# Patient Record
Sex: FEMALE | Race: WHITE | Employment: FULL TIME | URBAN - METROPOLITAN AREA
[De-identification: names, ages, dates, MRNs, and addresses within clinical notes are randomized per-mention and may not be internally consistent; named-entity substitution may affect disease eponyms.]

---

## 2017-07-21 ENCOUNTER — TELEPHONE (OUTPATIENT)
Dept: NEPHROLOGY | Facility: CLINIC | Age: 57
End: 2017-07-21

## 2017-07-21 NOTE — TELEPHONE ENCOUNTER
Spoke to Pt. Pt notified to call her insurance to see who what DME company is in their network. Pt stts she will call [facility].

## 2017-07-21 NOTE — TELEPHONE ENCOUNTER
Pt indicates [facility] was being used for C-PAP products. Pt indicates [employer] no longer excepts that. Pt is requesting for any suggestions as to what companycan be used now? Pls call back at:[phone], thanks.

## 2017-11-14 ENCOUNTER — LAB ENCOUNTER (OUTPATIENT)
Dept: LAB | Age: 57
End: 2017-11-14
Attending: INTERNAL MEDICINE
Payer: COMMERCIAL

## 2017-11-14 DIAGNOSIS — E78.5 HYPERLIPIDEMIA: Primary | ICD-10-CM

## 2017-11-14 PROCEDURE — 36415 COLL VENOUS BLD VENIPUNCTURE: CPT

## 2017-11-14 PROCEDURE — 80076 HEPATIC FUNCTION PANEL: CPT

## 2017-11-14 PROCEDURE — 80061 LIPID PANEL: CPT

## 2017-12-22 ENCOUNTER — OFFICE VISIT (OUTPATIENT)
Dept: PULMONOLOGY | Facility: CLINIC | Age: 57
End: 2017-12-22

## 2017-12-22 VITALS
RESPIRATION RATE: 18 BRPM | DIASTOLIC BLOOD PRESSURE: 75 MMHG | WEIGHT: 169.5 LBS | HEART RATE: 60 BPM | BODY MASS INDEX: 26.6 KG/M2 | HEIGHT: 67 IN | SYSTOLIC BLOOD PRESSURE: 119 MMHG | OXYGEN SATURATION: 99 %

## 2017-12-22 DIAGNOSIS — Z99.89 OSA ON CPAP: Primary | ICD-10-CM

## 2017-12-22 DIAGNOSIS — G47.33 OSA ON CPAP: Primary | ICD-10-CM

## 2017-12-22 DIAGNOSIS — J01.90 ACUTE SINUSITIS, RECURRENCE NOT SPECIFIED, UNSPECIFIED LOCATION: ICD-10-CM

## 2017-12-22 PROCEDURE — 99212 OFFICE O/P EST SF 10 MIN: CPT | Performed by: INTERNAL MEDICINE

## 2017-12-22 PROCEDURE — 99213 OFFICE O/P EST LOW 20 MIN: CPT | Performed by: INTERNAL MEDICINE

## 2017-12-22 RX ORDER — AZITHROMYCIN 250 MG/1
TABLET, FILM COATED ORAL
Qty: 6 TABLET | Refills: 0 | Status: SHIPPED | OUTPATIENT
Start: 2017-12-22 | End: 2018-10-20

## 2017-12-22 NOTE — PROGRESS NOTES
HPI:    Patient ID: Vikas Kamara is a 56year old female.     HPI  Doing very well with cpap   Much more awake and alert   No more daytime sleepiness or fatigue   No technical issue     No sob   Acute sinusitis last 2 days / mild naal -sinus press alcohol   Avoid driving if sleepy   Diet / exercise   Maintain regular sleep-awake cycles       2- acute sinusitis   z-pack                  Meds This Visit:  Signed Prescriptions Disp Refills    azithromycin 250 MG Oral Tab 6 tablet 0      Sig: take 2 tab

## 2018-07-06 ENCOUNTER — TELEPHONE (OUTPATIENT)
Dept: PULMONOLOGY | Facility: CLINIC | Age: 58
End: 2018-07-06

## 2018-07-06 DIAGNOSIS — G47.33 OSA (OBSTRUCTIVE SLEEP APNEA): Primary | ICD-10-CM

## 2018-07-06 NOTE — TELEPHONE ENCOUNTER
states wife no longer wants to continue services with [facility] for CPAP supplies.  asking for recommendations for different medical supply companies.  states he would like to try [facility].  Please call thank you [phone]

## 2018-07-06 NOTE — TELEPHONE ENCOUNTER
Spoke to Pt. Pt notified that her husaband requested a new DME company for the both of them. Pt stts she wants to switch to [facility] for cpap supplies. Per protocol orders placed for cpap supplies. Pt notified that order will be faxed to [facility].  Pt given t

## 2018-07-10 ENCOUNTER — HOSPITAL ENCOUNTER (OUTPATIENT)
Age: 58
Discharge: HOME | End: 2018-07-10
Attending: EMERGENCY MEDICINE
Payer: COMMERCIAL

## 2018-07-10 VITALS
RESPIRATION RATE: 18 BRPM | OXYGEN SATURATION: 98 % | BODY MASS INDEX: 27 KG/M2 | SYSTOLIC BLOOD PRESSURE: 151 MMHG | WEIGHT: 172 LBS | TEMPERATURE: 98 F | HEART RATE: 52 BPM | DIASTOLIC BLOOD PRESSURE: 79 MMHG
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
## 2018-07-10 PROCEDURE — 99214 OFFICE O/P EST MOD 30 MIN: CPT

## 2018-07-10 PROCEDURE — 99213 OFFICE O/P EST LOW 20 MIN: CPT

## 2018-07-10 RX ORDER — CEPHALEXIN 500 MG/1
500 CAPSULE ORAL 3 TIMES DAILY
Qty: 15 CAPSULE | Refills: 0 | Status: SHIPPED | OUTPATIENT
Start: 2018-07-10 | End: 2018-07-15

## 2018-07-10 RX ORDER — PREDNISONE 20 MG/1
40 TABLET ORAL DAILY
Qty: 10 TABLET | Refills: 0 | Status: SHIPPED | OUTPATIENT
Start: 2018-07-10 | End: 2018-07-15

## 2018-07-10 NOTE — ED INITIAL ASSESSMENT (HPI)
Has a rash on left hand for one week that comes and goes feels her face is itchy also and used allegra. Hx of poison ivy and is afraid she has it.  Small oval area dorsal hand

## 2018-07-10 NOTE — ED NOTES
Ok to use hydrocortisone cream lightly- will hold onto prednisone and take if getting worse. Fill and use po meds follow up with pcp in office as needed.

## 2018-07-10 NOTE — ED PROVIDER NOTES
Patient Seen in: [facility] Immediate Care In [address] [place] [address]    History   Patient presents with:   Allergic Rxn Allergies (immune)    Stated Complaint: lt hand rash    HPI    The patient is a 62-year-old female with no significant past medical history pr Patient is awake, alert and oriented ×3.   The patient's motor strength is 5 out of 5 and symmetric in the upper and lower extremities bilaterally  Extremities: No focal swelling or tenderness  Skin: There are 2 lesions noted to the dorsal aspect of the lef

## 2018-07-18 ENCOUNTER — APPOINTMENT (OUTPATIENT)
Dept: GENERAL RADIOLOGY | Age: 58
End: 2018-07-18
Attending: EMERGENCY MEDICINE
Payer: COMMERCIAL

## 2018-07-18 ENCOUNTER — HOSPITAL ENCOUNTER (OUTPATIENT)
Age: 58
Discharge: HOME | End: 2018-07-18
Attending: EMERGENCY MEDICINE
Payer: COMMERCIAL

## 2018-07-18 VITALS
TEMPERATURE: 98 F | HEART RATE: 63 BPM | SYSTOLIC BLOOD PRESSURE: 116 MMHG | OXYGEN SATURATION: 98 % | DIASTOLIC BLOOD PRESSURE: 71 MMHG | RESPIRATION RATE: 16 BRPM

## 2018-07-18 DIAGNOSIS — S20.211A CONTUSION OF RIGHT CHEST WALL, INITIAL ENCOUNTER: Primary | ICD-10-CM

## 2018-07-18 PROCEDURE — 99213 OFFICE O/P EST LOW 20 MIN: CPT

## 2018-07-18 PROCEDURE — 71101 X-RAY EXAM UNILAT RIBS/CHEST: CPT | Performed by: EMERGENCY MEDICINE

## 2018-07-18 PROCEDURE — 99214 OFFICE O/P EST MOD 30 MIN: CPT

## 2018-07-18 RX ORDER — IBUPROFEN 600 MG/1
600 TABLET ORAL EVERY 6 HOURS PRN
Qty: 30 TABLET | Refills: 0 | Status: SHIPPED | OUTPATIENT
Start: 2018-07-18 | End: 2018-07-25

## 2018-07-18 RX ORDER — TRAMADOL HYDROCHLORIDE 50 MG/1
TABLET ORAL EVERY 6 HOURS PRN
Qty: 20 TABLET | Refills: 0 | Status: SHIPPED | OUTPATIENT
Start: 2018-07-18 | End: 2018-10-20

## 2018-07-18 NOTE — ED INITIAL ASSESSMENT (HPI)
Pt states she tripped last night and fell into brick wall. C/o R side pain near ribs. Denies any head trauma or LOC.

## 2018-07-18 NOTE — ED PROVIDER NOTES
Patient Seen in: [facility] Immediate Care In [address] [place] [address]    History   Patient presents with:  Fall (musculoskeletal, neurologic)    Stated Complaint: fell/rib injury    HPI    67 yo female tripped and fell last night landed on her right side.  C/o pa no guarding. Musculoskeletal: Normal range of motion. She exhibits no edema or tenderness. Neurological: She is alert and oriented to person, place, and time. No cranial nerve deficit. Skin: Skin is warm and dry.    Psychiatric: She has a normal mood

## 2018-10-20 ENCOUNTER — HOSPITAL ENCOUNTER (OUTPATIENT)
Age: 58
Discharge: HOME | End: 2018-10-20
Attending: EMERGENCY MEDICINE
Payer: COMMERCIAL

## 2018-10-20 VITALS
BODY MASS INDEX: 27 KG/M2 | WEIGHT: 173 LBS | SYSTOLIC BLOOD PRESSURE: 147 MMHG | OXYGEN SATURATION: 99 % | DIASTOLIC BLOOD PRESSURE: 83 MMHG | RESPIRATION RATE: 16 BRPM | TEMPERATURE: 99 F | HEART RATE: 64 BPM

## 2018-10-20 DIAGNOSIS — J01.10 ACUTE FRONTAL SINUSITIS, RECURRENCE NOT SPECIFIED: Primary | ICD-10-CM

## 2018-10-20 PROCEDURE — 99214 OFFICE O/P EST MOD 30 MIN: CPT

## 2018-10-20 PROCEDURE — 87430 STREP A AG IA: CPT

## 2018-10-20 PROCEDURE — 99213 OFFICE O/P EST LOW 20 MIN: CPT

## 2018-10-20 RX ORDER — AMOXICILLIN 875 MG/1
875 TABLET, COATED ORAL 2 TIMES DAILY
Qty: 10 TABLET | Refills: 0 | Status: SHIPPED | OUTPATIENT
Start: 2018-10-20 | End: 2018-10-25

## 2018-10-20 RX ORDER — PREDNISONE 20 MG/1
40 TABLET ORAL DAILY
Qty: 6 TABLET | Refills: 0 | Status: SHIPPED | OUTPATIENT
Start: 2018-10-20 | End: 2018-10-23

## 2018-10-20 NOTE — ED PROVIDER NOTES
Patient Seen in: [facility] Immediate Care In [address] [place] [address]    History   Patient presents with:  Cough/URI    Stated Complaint: sinus,sore throat    HPI      HISTORY OF PRESENT ILLNESS:Patient complains of URI symptoms for 4 days.   Complains of sinus c kg   SpO2 99%   BMI 27.10 kg/m²   PULSE OX Nl on room air    General Appearance: awake, alert, non toxic  ENT, Mouth: mucous membranes moist, tender over frontal sinuses, nasal tubrinates boggy, ]    Dental exam:normal dentition  Throat exam:no erythema, n

## 2018-10-20 NOTE — ED INITIAL ASSESSMENT (HPI)
Sore throat and sinus congestions for 2 days stts strep in \"office \" and leaving for [place] Tuesday

## 2018-12-21 ENCOUNTER — OFFICE VISIT (OUTPATIENT)
Dept: PULMONOLOGY | Facility: CLINIC | Age: 58
End: 2018-12-21
Payer: COMMERCIAL

## 2018-12-21 VITALS
HEART RATE: 56 BPM | SYSTOLIC BLOOD PRESSURE: 129 MMHG | RESPIRATION RATE: 16 BRPM | WEIGHT: 171.5 LBS | DIASTOLIC BLOOD PRESSURE: 84 MMHG | HEIGHT: 67 IN | BODY MASS INDEX: 26.92 KG/M2 | OXYGEN SATURATION: 98 %

## 2018-12-21 DIAGNOSIS — Z99.89 OSA ON CPAP: Primary | ICD-10-CM

## 2018-12-21 DIAGNOSIS — G47.33 OSA ON CPAP: Primary | ICD-10-CM

## 2018-12-21 PROCEDURE — 99213 OFFICE O/P EST LOW 20 MIN: CPT | Performed by: INTERNAL MEDICINE

## 2018-12-21 PROCEDURE — 99212 OFFICE O/P EST SF 10 MIN: CPT | Performed by: INTERNAL MEDICINE

## 2018-12-21 NOTE — PROGRESS NOTES
HPI:    Patient ID: Eleazar Bradshaw is a 66year old female.     HPI    Review of Systems         Current Outpatient Medications:  tretinoin 0.025 % External Cream Apply a pea-sized amount to the entire face every other night for 2 wks then every nig

## 2019-02-20 ENCOUNTER — HOSPITAL ENCOUNTER (OUTPATIENT)
Age: 59
Discharge: HOME | End: 2019-02-20
Attending: EMERGENCY MEDICINE
Payer: COMMERCIAL

## 2019-02-20 VITALS
OXYGEN SATURATION: 99 % | RESPIRATION RATE: 18 BRPM | HEART RATE: 73 BPM | BODY MASS INDEX: 27 KG/M2 | TEMPERATURE: 99 F | DIASTOLIC BLOOD PRESSURE: 81 MMHG | WEIGHT: 170 LBS | SYSTOLIC BLOOD PRESSURE: 123 MMHG | HEIGHT: 66.5 IN

## 2019-02-20 DIAGNOSIS — J01.00 ACUTE NON-RECURRENT MAXILLARY SINUSITIS: Primary | ICD-10-CM

## 2019-02-20 PROCEDURE — 99213 OFFICE O/P EST LOW 20 MIN: CPT

## 2019-02-20 PROCEDURE — 99214 OFFICE O/P EST MOD 30 MIN: CPT

## 2019-02-20 RX ORDER — FLUTICASONE PROPIONATE 50 MCG
2 SPRAY, SUSPENSION (ML) NASAL DAILY
Qty: 16 G | Refills: 0 | Status: SHIPPED | OUTPATIENT
Start: 2019-02-20 | End: 2019-03-22

## 2019-02-20 RX ORDER — CHOLECALCIFEROL (VITAMIN D3) 125 MCG
500 CAPSULE ORAL DAILY
COMMUNITY

## 2019-02-20 RX ORDER — AMOXICILLIN AND CLAVULANATE POTASSIUM 875; 125 MG/1; MG/1
1 TABLET, FILM COATED ORAL 2 TIMES DAILY
Qty: 20 TABLET | Refills: 0 | Status: SHIPPED | OUTPATIENT
Start: 2019-02-20 | End: 2019-03-02

## 2019-02-20 NOTE — ED PROVIDER NOTES
Patient Seen in: [facility] Immediate Care In [address] [place] [address]    History   Patient presents with:  Cough/URI    Stated Complaint: sinus inf    HPI    Patient here with cough, congestion for 4 days. No travel, no known sick contacts.   Patient denies sig Resp 18   Temp 98.9 °F (37.2 °C)   Temp src Oral   SpO2 99 %   O2 Device None (Room air)       Current:/81   Pulse 73   Temp 98.9 °F (37.2 °C) (Oral)   Resp 18   Ht 168.9 cm (5' 6.5\")   Wt 77.1 kg   SpO2 99%   BMI 27.03 kg/m²   PULSE OX   GENERAL:

## 2019-02-20 NOTE — ED INITIAL ASSESSMENT (HPI)
C/o coughing headache nasal congestion and facial pain with post nasal drip for 2 days   Chills and fever

## 2019-03-05 ENCOUNTER — LAB ENCOUNTER (OUTPATIENT)
Dept: LAB | Age: 59
End: 2019-03-05
Attending: INTERNAL MEDICINE
Payer: COMMERCIAL

## 2019-03-05 DIAGNOSIS — E78.5 HYPERLIPIDEMIA: Primary | ICD-10-CM

## 2019-03-05 LAB
ALBUMIN SERPL-MCNC: 3.7 G/DL (ref 3.4–5)
ALBUMIN/GLOB SERPL: 1.2 {RATIO} (ref 1–2)
ALP LIVER SERPL-CCNC: 79 U/L (ref 46–118)
ALT SERPL-CCNC: 41 U/L (ref 13–56)
ANION GAP SERPL CALC-SCNC: 6 MMOL/L (ref 0–18)
AST SERPL-CCNC: 18 U/L (ref 15–37)
BILIRUB SERPL-MCNC: 0.4 MG/DL (ref 0.1–2)
BUN BLD-MCNC: 9 MG/DL (ref 7–18)
BUN/CREAT SERPL: 15.5 (ref 10–20)
CALCIUM BLD-MCNC: 8.5 MG/DL (ref 8.5–10.1)
CHLORIDE SERPL-SCNC: 108 MMOL/L (ref 98–107)
CHOLEST SMN-MCNC: 193 MG/DL (ref ?–200)
CO2 SERPL-SCNC: 29 MMOL/L (ref 21–32)
CREAT BLD-MCNC: 0.58 MG/DL (ref 0.55–1.02)
GLOBULIN PLAS-MCNC: 3 G/DL (ref 2.8–4.4)
GLUCOSE BLD-MCNC: 88 MG/DL (ref 70–99)
HDLC SERPL-MCNC: 47 MG/DL (ref 40–59)
LDLC SERPL CALC-MCNC: 120 MG/DL (ref ?–100)
M PROTEIN MFR SERPL ELPH: 6.7 G/DL (ref 6.4–8.2)
NONHDLC SERPL-MCNC: 146 MG/DL (ref ?–130)
OSMOLALITY SERPL CALC.SUM OF ELEC: 294 MOSM/KG (ref 275–295)
POTASSIUM SERPL-SCNC: 3.8 MMOL/L (ref 3.5–5.1)
SODIUM SERPL-SCNC: 143 MMOL/L (ref 136–145)
TRIGL SERPL-MCNC: 129 MG/DL (ref 30–149)
VLDLC SERPL CALC-MCNC: 26 MG/DL (ref 0–30)

## 2019-03-05 PROCEDURE — 80061 LIPID PANEL: CPT

## 2019-03-05 PROCEDURE — 36415 COLL VENOUS BLD VENIPUNCTURE: CPT

## 2019-03-05 PROCEDURE — 80053 COMPREHEN METABOLIC PANEL: CPT

## 2019-12-07 ENCOUNTER — HOSPITAL ENCOUNTER (OUTPATIENT)
Age: 59
Discharge: HOME | End: 2019-12-07
Attending: EMERGENCY MEDICINE
Payer: COMMERCIAL

## 2019-12-07 VITALS
DIASTOLIC BLOOD PRESSURE: 85 MMHG | HEIGHT: 67 IN | BODY MASS INDEX: 27 KG/M2 | TEMPERATURE: 99 F | OXYGEN SATURATION: 100 % | RESPIRATION RATE: 20 BRPM | SYSTOLIC BLOOD PRESSURE: 147 MMHG | HEART RATE: 60 BPM | WEIGHT: 172 LBS

## 2019-12-07 DIAGNOSIS — J01.90 ACUTE SINUSITIS, RECURRENCE NOT SPECIFIED, UNSPECIFIED LOCATION: Primary | ICD-10-CM

## 2019-12-07 PROCEDURE — 99214 OFFICE O/P EST MOD 30 MIN: CPT

## 2019-12-07 PROCEDURE — 99213 OFFICE O/P EST LOW 20 MIN: CPT

## 2019-12-07 RX ORDER — AMOXICILLIN 500 MG/1
500 TABLET, FILM COATED ORAL 3 TIMES DAILY
Qty: 30 TABLET | Refills: 0 | Status: SHIPPED | OUTPATIENT
Start: 2019-12-07 | End: 2019-12-17

## 2019-12-07 NOTE — ED PROVIDER NOTES
Patient Seen in: [facility] Immediate Care In [address] [place] [address]    History   Patient presents with:  Cough/URI    Stated Complaint: Sinus Problem    HPI    is here with concern of sinus infection she is an ex-smoker she states she gets these every once in Resp 20   Temp 98.6 °F (37 °C)   Temp src    SpO2 100 %   O2 Device None (Room air)       Current:/85   Pulse 60   Temp 98.6 °F (37 °C)   Resp 20   Ht 170.2 cm (5' 7\")   Wt 78 kg   SpO2 100%   BMI 26.94 kg/m²         Physical Exam  Constitutional: days.  Clement Max: 30 tablet Refills: 0

## 2019-12-20 ENCOUNTER — OFFICE VISIT (OUTPATIENT)
Dept: PULMONOLOGY | Facility: CLINIC | Age: 59
End: 2019-12-20
Payer: COMMERCIAL

## 2019-12-20 VITALS
RESPIRATION RATE: 18 BRPM | HEART RATE: 59 BPM | BODY MASS INDEX: 27 KG/M2 | OXYGEN SATURATION: 100 % | SYSTOLIC BLOOD PRESSURE: 123 MMHG | WEIGHT: 172 LBS | DIASTOLIC BLOOD PRESSURE: 81 MMHG | HEIGHT: 67 IN

## 2019-12-20 DIAGNOSIS — G47.33 OSA ON CPAP: Primary | ICD-10-CM

## 2019-12-20 DIAGNOSIS — Z99.89 OSA ON CPAP: Primary | ICD-10-CM

## 2019-12-20 PROCEDURE — 99213 OFFICE O/P EST LOW 20 MIN: CPT | Performed by: INTERNAL MEDICINE

## 2019-12-20 NOTE — PROGRESS NOTES
HPI:    Patient ID: Morena Quarles is a 64year old female.     HPI   yearly visit , doing great and can not sleep without cpap   No daytime sleepiness or fatigue   No sinus pressure   No headache   No technical issue with cpap therapy   Review of S requested or ordered in this encounter       Imaging & Referrals:  None       MA#[id number]

## 2020-10-09 ENCOUNTER — HOSPITAL ENCOUNTER (OUTPATIENT)
Age: 60
Discharge: HOME | End: 2020-10-09
Attending: EMERGENCY MEDICINE
Payer: COMMERCIAL

## 2020-10-09 ENCOUNTER — APPOINTMENT (OUTPATIENT)
Dept: GENERAL RADIOLOGY | Age: 60
End: 2020-10-09
Attending: EMERGENCY MEDICINE
Payer: COMMERCIAL

## 2020-10-09 VITALS
BODY MASS INDEX: 27 KG/M2 | OXYGEN SATURATION: 99 % | TEMPERATURE: 98 F | WEIGHT: 168 LBS | SYSTOLIC BLOOD PRESSURE: 138 MMHG | HEART RATE: 62 BPM | HEIGHT: 66 IN | RESPIRATION RATE: 18 BRPM | DIASTOLIC BLOOD PRESSURE: 81 MMHG

## 2020-10-09 DIAGNOSIS — M25.559 HIP PAIN: Primary | ICD-10-CM

## 2020-10-09 PROCEDURE — 73502 X-RAY EXAM HIP UNI 2-3 VIEWS: CPT | Performed by: EMERGENCY MEDICINE

## 2020-10-09 PROCEDURE — 99213 OFFICE O/P EST LOW 20 MIN: CPT | Performed by: EMERGENCY MEDICINE

## 2020-10-09 RX ORDER — METHYLPREDNISOLONE 4 MG/1
TABLET ORAL
Qty: 1 PACKAGE | Refills: 0 | Status: SHIPPED | OUTPATIENT
Start: 2020-10-09 | End: 2022-01-24

## 2020-10-09 NOTE — ED NOTES
meds and s/e reviewed. Avoid strenuous activity till acute phase resolved. Speak to pcp about alternative treatments like p/t massage/ yoga /accupuncture. Follow up in office.

## 2020-10-09 NOTE — ED PROVIDER NOTES
[facility] In [address] [place] [address]      History   Patient presents with:  Back Pain    Stated Complaint: sciatica rt leg    Patient complains of a week of pain located in the right hip and buttock.   She describes pain as a cons Pulse 62   Resp 18   Temp 98.1 °F (36.7 °C)   Temp src Temporal   SpO2 99 %   O2 Device None (Room air)       Current:/81   Pulse 62   Temp 98.1 °F (36.7 °C) (Temporal)   Resp 18   Ht 167.6 cm (5' 6\")   Wt 76.2 kg   SpO2 99%   BMI 27.12 kg/m² Behavior normal.         ED Course         XR HIP W OR WO PELVIS 2 OR 3 VIEWS, RIGHT (CPT=73502) (Final result)  Result time 10/09/20 09:20:04  Final result by Gely Stevens MD (10/09/20 09:20:04)                Impression:    CONCLUSION:   1.  Essentially

## 2020-12-18 ENCOUNTER — VIRTUAL PHONE E/M (OUTPATIENT)
Dept: PULMONOLOGY | Facility: CLINIC | Age: 60
End: 2020-12-18

## 2020-12-18 DIAGNOSIS — G47.33 OSA ON CPAP: Primary | ICD-10-CM

## 2020-12-18 DIAGNOSIS — Z99.89 OSA ON CPAP: Primary | ICD-10-CM

## 2020-12-18 PROCEDURE — 99212 OFFICE O/P EST SF 10 MIN: CPT | Performed by: INTERNAL MEDICINE

## 2020-12-18 NOTE — PROGRESS NOTES
Virtual Telephone Check-In    Ramonita Avendano verbally consents to a Virtual/Telephone Check-In visit on 12/18/20. Patient has been referred to the [facility] website at [email or web address] to review the yearly Consent to Treat document.     Patient

## 2020-12-18 NOTE — PROGRESS NOTES
HPI:    Patient ID: Lady Valadez is a 64year old female.     HPI    Review of Systems         Current Outpatient Medications   Medication Sig Dispense Refill   • methylPREDNISolone (MEDROL) 4 MG Oral Tablet Therapy Pack Dosepack: take as directed

## 2021-02-11 DIAGNOSIS — R74.8 LIVER ENZYME ELEVATION: Primary | ICD-10-CM

## 2021-02-11 DIAGNOSIS — E78.5 HYPERLIPIDEMIA LDL GOAL <100: ICD-10-CM

## 2021-02-26 ENCOUNTER — LAB ENCOUNTER (OUTPATIENT)
Dept: LAB | Facility: REFERENCE LAB | Age: 61
End: 2021-02-26
Attending: INTERNAL MEDICINE
Payer: COMMERCIAL

## 2021-02-26 DIAGNOSIS — E78.5 HYPERLIPIDEMIA LDL GOAL <100: ICD-10-CM

## 2021-02-26 LAB
ALBUMIN SERPL-MCNC: 3.8 G/DL (ref 3.4–5)
ALBUMIN/GLOB SERPL: 1.4 {RATIO} (ref 1–2)
ALP LIVER SERPL-CCNC: 87 U/L
ALT SERPL-CCNC: 38 U/L
ANION GAP SERPL CALC-SCNC: 2 MMOL/L (ref 0–18)
AST SERPL-CCNC: 18 U/L (ref 15–37)
BILIRUB SERPL-MCNC: 0.3 MG/DL (ref 0.1–2)
BUN BLD-MCNC: 15 MG/DL (ref 7–18)
BUN/CREAT SERPL: 25 (ref 10–20)
CALCIUM BLD-MCNC: 8.8 MG/DL (ref 8.5–10.1)
CHLORIDE SERPL-SCNC: 108 MMOL/L (ref 98–112)
CHOLEST SMN-MCNC: 219 MG/DL (ref ?–200)
CO2 SERPL-SCNC: 30 MMOL/L (ref 21–32)
CREAT BLD-MCNC: 0.6 MG/DL
GLOBULIN PLAS-MCNC: 2.8 G/DL (ref 2.8–4.4)
GLUCOSE BLD-MCNC: 93 MG/DL (ref 70–99)
HDLC SERPL-MCNC: 64 MG/DL (ref 40–59)
LDLC SERPL CALC-MCNC: 144 MG/DL (ref ?–100)
M PROTEIN MFR SERPL ELPH: 6.6 G/DL (ref 6.4–8.2)
NONHDLC SERPL-MCNC: 155 MG/DL (ref ?–130)
OSMOLALITY SERPL CALC.SUM OF ELEC: 291 MOSM/KG (ref 275–295)
PATIENT FASTING Y/N/NP: YES
PATIENT FASTING Y/N/NP: YES
POTASSIUM SERPL-SCNC: 3.9 MMOL/L (ref 3.5–5.1)
SODIUM SERPL-SCNC: 140 MMOL/L (ref 136–145)
TRIGL SERPL-MCNC: 53 MG/DL (ref 30–149)
VLDLC SERPL CALC-MCNC: 11 MG/DL (ref 0–30)

## 2021-02-26 PROCEDURE — 80061 LIPID PANEL: CPT

## 2021-02-26 PROCEDURE — 80053 COMPREHEN METABOLIC PANEL: CPT

## 2021-02-26 PROCEDURE — 36415 COLL VENOUS BLD VENIPUNCTURE: CPT

## 2021-07-27 ENCOUNTER — TELEPHONE (OUTPATIENT)
Dept: PULMONOLOGY | Facility: CLINIC | Age: 61
End: 2021-07-27

## 2021-07-27 DIAGNOSIS — G47.33 OSA (OBSTRUCTIVE SLEEP APNEA): Primary | ICD-10-CM

## 2021-07-28 NOTE — TELEPHONE ENCOUNTER
Spoke with pt who states that she is still using her machine. Her machine is about 11years old come Aug 1st. Pt would be eligible for new machine at that time. Pt requesting a new machine.  please sign off pending order if agreeable.

## 2021-07-31 ENCOUNTER — LAB ENCOUNTER (OUTPATIENT)
Dept: LAB | Age: 61
End: 2021-07-31
Attending: INTERNAL MEDICINE
Payer: COMMERCIAL

## 2021-07-31 DIAGNOSIS — E78.5 HYPERLIPIDEMIA LDL GOAL <100: ICD-10-CM

## 2021-07-31 LAB
CHOLEST SMN-MCNC: 204 MG/DL (ref ?–200)
HDLC SERPL-MCNC: 77 MG/DL (ref 40–59)
LDLC SERPL CALC-MCNC: 119 MG/DL (ref ?–100)
NONHDLC SERPL-MCNC: 127 MG/DL (ref ?–130)
PATIENT FASTING Y/N/NP: YES
TRIGL SERPL-MCNC: 44 MG/DL (ref 30–149)
VLDLC SERPL CALC-MCNC: 8 MG/DL (ref 0–30)

## 2021-07-31 PROCEDURE — 80061 LIPID PANEL: CPT

## 2021-07-31 PROCEDURE — 36415 COLL VENOUS BLD VENIPUNCTURE: CPT

## 2021-08-16 ENCOUNTER — PATIENT MESSAGE (OUTPATIENT)
Dept: PULMONOLOGY | Facility: CLINIC | Age: 61
End: 2021-08-16

## 2021-09-14 ENCOUNTER — PATIENT MESSAGE (OUTPATIENT)
Dept: PULMONOLOGY | Facility: CLINIC | Age: 61
End: 2021-09-14

## 2021-09-24 NOTE — TELEPHONE ENCOUNTER
Spoke with [facility] and was informed that pt's machine was shipped should arrive within 7-5 business days. Provided pt the pt with the updated status of machine replacement. Pt voiced understanding and denies any further questions at this time.

## 2021-12-02 ENCOUNTER — PATIENT MESSAGE (OUTPATIENT)
Dept: PULMONOLOGY | Facility: CLINIC | Age: 61
End: 2021-12-02

## 2021-12-02 NOTE — TELEPHONE ENCOUNTER
From: Kika Tucker  To: Florina Donohue. Edgar Hill MD  Sent: 12/2/2021 8:08 AM CST  Subject: Need appt in Dec 2021 - for CPap     Hi - I thought I had made a Follow up Cpap Appt with Dr. Edgar Hill for December 2021 -- I have to see him for insurance reasons.

## 2021-12-02 NOTE — TELEPHONE ENCOUNTER
Spoke with patient and scheduled follow up appointment 12/13/21 at 12:00 PM. Discussed office address and where to park.

## 2021-12-13 ENCOUNTER — OFFICE VISIT (OUTPATIENT)
Dept: PULMONOLOGY | Facility: CLINIC | Age: 61
End: 2021-12-13
Payer: COMMERCIAL

## 2021-12-13 VITALS
RESPIRATION RATE: 18 BRPM | TEMPERATURE: 99 F | HEIGHT: 66.5 IN | SYSTOLIC BLOOD PRESSURE: 123 MMHG | DIASTOLIC BLOOD PRESSURE: 79 MMHG | HEART RATE: 65 BPM | BODY MASS INDEX: 27.64 KG/M2 | WEIGHT: 174 LBS | OXYGEN SATURATION: 98 %

## 2021-12-13 DIAGNOSIS — G47.33 OSA ON CPAP: Primary | ICD-10-CM

## 2021-12-13 DIAGNOSIS — Z99.89 OSA ON CPAP: Primary | ICD-10-CM

## 2021-12-13 PROCEDURE — 3008F BODY MASS INDEX DOCD: CPT | Performed by: INTERNAL MEDICINE

## 2021-12-13 PROCEDURE — 99213 OFFICE O/P EST LOW 20 MIN: CPT | Performed by: INTERNAL MEDICINE

## 2021-12-13 PROCEDURE — 3074F SYST BP LT 130 MM HG: CPT | Performed by: INTERNAL MEDICINE

## 2021-12-13 PROCEDURE — 3078F DIAST BP <80 MM HG: CPT | Performed by: INTERNAL MEDICINE

## 2021-12-13 NOTE — PROGRESS NOTES
Subjective:   Patient ID: Eleno Haque is a 63year old female.     HPI    In bed 9:30 pm to 6 AM very compliant with the CPAP /and cannot sleep without it  No nocturia and refreshed in the morning with no significant daytime sleepiness or fatigue Breath sounds: No wheezing or rales. Musculoskeletal:      Cervical back: Normal range of motion. Right lower leg: No edema. Left lower leg: No edema. Skin:     General: Skin is dry.       Capillary Refill: Capillary refill takes more caitlin

## 2022-02-12 ENCOUNTER — LAB ENCOUNTER (OUTPATIENT)
Dept: LAB | Age: 62
End: 2022-02-12
Attending: INTERNAL MEDICINE
Payer: COMMERCIAL

## 2022-02-12 DIAGNOSIS — E78.00 PURE HYPERCHOLESTEROLEMIA: ICD-10-CM

## 2022-02-12 LAB
ALBUMIN SERPL-MCNC: 3.7 G/DL (ref 3.4–5)
ALBUMIN/GLOB SERPL: 1.3 {RATIO} (ref 1–2)
ALP LIVER SERPL-CCNC: 90 U/L
ALT SERPL-CCNC: 30 U/L
AST SERPL-CCNC: 16 U/L (ref 15–37)
BILIRUB SERPL-MCNC: 0.2 MG/DL (ref 0.1–2)
BUN BLD-MCNC: 18 MG/DL (ref 7–18)
BUN/CREAT SERPL: 27.7 (ref 10–20)
CALCIUM BLD-MCNC: 9.4 MG/DL (ref 8.5–10.1)
CHLORIDE SERPL-SCNC: 106 MMOL/L (ref 98–112)
CHOLEST SERPL-MCNC: 197 MG/DL (ref ?–200)
CO2 SERPL-SCNC: 29 MMOL/L (ref 21–32)
CREAT BLD-MCNC: 0.65 MG/DL
FASTING PATIENT LIPID ANSWER: YES
FASTING STATUS PATIENT QL REPORTED: YES
GLOBULIN PLAS-MCNC: 2.8 G/DL (ref 2.8–4.4)
GLUCOSE BLD-MCNC: 103 MG/DL (ref 70–99)
HDLC SERPL-MCNC: 74 MG/DL (ref 40–59)
LDLC SERPL CALC-MCNC: 116 MG/DL (ref ?–100)
NONHDLC SERPL-MCNC: 123 MG/DL (ref ?–130)
OSMOLALITY SERPL CALC.SUM OF ELEC: 290 MOSM/KG (ref 275–295)
POTASSIUM SERPL-SCNC: 4.3 MMOL/L (ref 3.5–5.1)
PROT SERPL-MCNC: 6.5 G/DL (ref 6.4–8.2)
SODIUM SERPL-SCNC: 139 MMOL/L (ref 136–145)
TRIGL SERPL-MCNC: 36 MG/DL (ref 30–149)
VLDLC SERPL CALC-MCNC: 6 MG/DL (ref 0–30)

## 2022-02-12 PROCEDURE — 36415 COLL VENOUS BLD VENIPUNCTURE: CPT

## 2022-02-12 PROCEDURE — 80053 COMPREHEN METABOLIC PANEL: CPT

## 2022-02-12 PROCEDURE — 80061 LIPID PANEL: CPT

## 2022-03-20 ENCOUNTER — HOSPITAL ENCOUNTER (OUTPATIENT)
Age: 62
Discharge: HOME | End: 2022-03-20
Payer: COMMERCIAL

## 2022-03-20 VITALS
DIASTOLIC BLOOD PRESSURE: 61 MMHG | TEMPERATURE: 98 F | RESPIRATION RATE: 18 BRPM | OXYGEN SATURATION: 99 % | HEART RATE: 80 BPM | WEIGHT: 172 LBS | SYSTOLIC BLOOD PRESSURE: 117 MMHG | BODY MASS INDEX: 27.64 KG/M2 | HEIGHT: 66 IN
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
## 2022-03-20 PROCEDURE — 99213 OFFICE O/P EST LOW 20 MIN: CPT | Performed by: PHYSICIAN ASSISTANT

## 2022-03-20 RX ORDER — DIPHENHYDRAMINE HCL 25 MG
25 CAPSULE ORAL EVERY 6 HOURS PRN
Qty: 20 CAPSULE | Refills: 0 | Status: SHIPPED | OUTPATIENT
Start: 2022-03-20

## 2022-03-20 RX ORDER — CEPHALEXIN 500 MG/1
500 CAPSULE ORAL 3 TIMES DAILY
Qty: 21 CAPSULE | Refills: 0 | Status: SHIPPED | OUTPATIENT
Start: 2022-03-20 | End: 2022-03-27

## 2022-03-20 NOTE — ED INITIAL ASSESSMENT (HPI)
Pt here w c/o possible bug bite to L calf. Pt denies fever. States woke up with redness to but behind calf. +itchy.

## 2022-06-05 ENCOUNTER — HOSPITAL ENCOUNTER (OUTPATIENT)
Age: 62
Discharge: HOME | End: 2022-06-05
Payer: COMMERCIAL

## 2022-06-05 VITALS
SYSTOLIC BLOOD PRESSURE: 133 MMHG | HEIGHT: 66 IN | HEART RATE: 65 BPM | DIASTOLIC BLOOD PRESSURE: 81 MMHG | BODY MASS INDEX: 27.64 KG/M2 | RESPIRATION RATE: 18 BRPM | OXYGEN SATURATION: 100 % | TEMPERATURE: 98 F | WEIGHT: 172 LBS

## 2022-06-05 DIAGNOSIS — B02.9 HERPES ZOSTER WITHOUT COMPLICATION: Primary | ICD-10-CM

## 2022-06-05 PROCEDURE — 99213 OFFICE O/P EST LOW 20 MIN: CPT | Performed by: NURSE PRACTITIONER

## 2022-06-05 RX ORDER — PURIFIED WATER 986 MG/ML
10 SOLUTION OPHTHALMIC ONCE
Status: COMPLETED | OUTPATIENT
Start: 2022-06-05 | End: 2022-06-05

## 2022-06-05 RX ORDER — TETRACAINE HYDROCHLORIDE 5 MG/ML
1 SOLUTION OPHTHALMIC ONCE
Status: COMPLETED | OUTPATIENT
Start: 2022-06-05 | End: 2022-06-05

## 2022-06-05 RX ORDER — VALACYCLOVIR HYDROCHLORIDE 1 G/1
1000 TABLET, FILM COATED ORAL 3 TIMES DAILY
Qty: 21 TABLET | Refills: 0 | Status: SHIPPED | OUTPATIENT
Start: 2022-06-05 | End: 2022-06-12

## 2022-06-08 DIAGNOSIS — B02.7 DISSEMINATED HERPES ZOSTER: Primary | ICD-10-CM

## 2022-07-27 ENCOUNTER — HOSPITAL ENCOUNTER (OUTPATIENT)
Age: 62
Discharge: HOME | End: 2022-07-27
Payer: COMMERCIAL

## 2022-07-27 VITALS
HEART RATE: 70 BPM | DIASTOLIC BLOOD PRESSURE: 79 MMHG | OXYGEN SATURATION: 97 % | RESPIRATION RATE: 18 BRPM | TEMPERATURE: 99 F | SYSTOLIC BLOOD PRESSURE: 134 MMHG

## 2022-07-27 DIAGNOSIS — J06.9 VIRAL UPPER RESPIRATORY TRACT INFECTION: Primary | ICD-10-CM

## 2022-07-27 DIAGNOSIS — B97.89 SORE THROAT (VIRAL): ICD-10-CM

## 2022-07-27 DIAGNOSIS — R21 RASH OF FOOT: ICD-10-CM

## 2022-07-27 DIAGNOSIS — Z20.822 LAB TEST NEGATIVE FOR COVID-19 VIRUS: ICD-10-CM

## 2022-07-27 DIAGNOSIS — Z20.822 ENCOUNTER FOR LABORATORY TESTING FOR COVID-19 VIRUS: ICD-10-CM

## 2022-07-27 DIAGNOSIS — J02.8 SORE THROAT (VIRAL): ICD-10-CM

## 2022-07-27 LAB
S PYO AG THROAT QL: NEGATIVE
SARS-COV-2 RNA RESP QL NAA+PROBE: NOT DETECTED

## 2022-07-27 PROCEDURE — U0002 COVID-19 LAB TEST NON-CDC: HCPCS | Performed by: NURSE PRACTITIONER

## 2022-07-27 PROCEDURE — 99213 OFFICE O/P EST LOW 20 MIN: CPT | Performed by: NURSE PRACTITIONER

## 2022-07-27 PROCEDURE — 87880 STREP A ASSAY W/OPTIC: CPT | Performed by: NURSE PRACTITIONER

## 2022-07-27 RX ORDER — BENZONATATE 100 MG/1
100 CAPSULE ORAL 3 TIMES DAILY PRN
Qty: 12 CAPSULE | Refills: 0 | Status: SHIPPED | OUTPATIENT
Start: 2022-07-27

## 2022-11-16 ENCOUNTER — HOSPITAL ENCOUNTER (OUTPATIENT)
Age: 62
Discharge: HOME | End: 2022-11-16
Payer: COMMERCIAL

## 2022-11-16 VITALS
TEMPERATURE: 99 F | OXYGEN SATURATION: 98 % | HEART RATE: 81 BPM | SYSTOLIC BLOOD PRESSURE: 130 MMHG | RESPIRATION RATE: 16 BRPM | DIASTOLIC BLOOD PRESSURE: 80 MMHG

## 2022-11-16 DIAGNOSIS — J01.00 ACUTE NON-RECURRENT MAXILLARY SINUSITIS: Primary | ICD-10-CM

## 2022-11-16 DIAGNOSIS — U07.1 COVID: ICD-10-CM

## 2022-11-16 DIAGNOSIS — H66.002 NON-RECURRENT ACUTE SUPPURATIVE OTITIS MEDIA OF LEFT EAR WITHOUT SPONTANEOUS RUPTURE OF TYMPANIC MEMBRANE: ICD-10-CM

## 2022-11-16 DIAGNOSIS — H61.21 IMPACTED CERUMEN OF RIGHT EAR: ICD-10-CM

## 2022-11-16 LAB — SARS-COV-2 RNA RESP QL NAA+PROBE: DETECTED

## 2022-11-16 PROCEDURE — U0002 COVID-19 LAB TEST NON-CDC: HCPCS | Performed by: NURSE PRACTITIONER

## 2022-11-16 PROCEDURE — 99213 OFFICE O/P EST LOW 20 MIN: CPT | Performed by: NURSE PRACTITIONER

## 2022-11-16 RX ORDER — AMOXICILLIN 875 MG/1
875 TABLET, COATED ORAL 2 TIMES DAILY
Qty: 20 TABLET | Refills: 0 | Status: SHIPPED | OUTPATIENT
Start: 2022-11-16 | End: 2022-11-26

## 2022-11-16 NOTE — ED INITIAL ASSESSMENT (HPI)
Pt c/o sinus pain/pressure, HA, bilateral ear pain, nasal congestion and post-nasal drip x 3 days.  No fever

## 2022-11-16 NOTE — DISCHARGE INSTRUCTIONS
COVID test is positive. The COVID virus is most likely causing your symptoms. The amoxicillin may or may not help with the ear pain and the sinus congestion. Continue your antihistamines. Recommend Sudafed over-the-counter for congestion.   Follow-up with your primary doctor

## 2022-11-29 ENCOUNTER — OFFICE VISIT (OUTPATIENT)
Dept: OTOLARYNGOLOGY | Facility: CLINIC | Age: 62
End: 2022-11-29
Payer: COMMERCIAL

## 2022-11-29 VITALS — HEIGHT: 66 IN | WEIGHT: 172 LBS | BODY MASS INDEX: 27.64 KG/M2 | TEMPERATURE: 97 F

## 2022-11-29 DIAGNOSIS — H90.5 SENSORINEURAL HEARING LOSS (SNHL) OF RIGHT EAR, UNSPECIFIED HEARING STATUS ON CONTRALATERAL SIDE: Primary | ICD-10-CM

## 2022-11-29 DIAGNOSIS — H61.21 CERUMEN DEBRIS ON TYMPANIC MEMBRANE OF RIGHT EAR: ICD-10-CM

## 2022-11-29 PROCEDURE — 3008F BODY MASS INDEX DOCD: CPT | Performed by: SPECIALIST

## 2022-11-29 PROCEDURE — 69210 REMOVE IMPACTED EAR WAX UNI: CPT | Performed by: SPECIALIST

## 2022-11-29 PROCEDURE — 99202 OFFICE O/P NEW SF 15 MIN: CPT | Performed by: SPECIALIST

## 2022-11-29 NOTE — PATIENT INSTRUCTIONS
Cerumen was fully cleaned from the right ear. Is there still seems to be some asymmetric hearing loss an audiogram was ordered to better evaluate this. I will of course notify you of these results. Can continue to use the Allegra and Flonase only as needed. No need to use any further otic drops. Avoid cotton swabs.

## 2022-12-19 ENCOUNTER — OFFICE VISIT (OUTPATIENT)
Dept: PULMONOLOGY | Facility: CLINIC | Age: 62
End: 2022-12-19
Payer: COMMERCIAL

## 2022-12-19 VITALS
SYSTOLIC BLOOD PRESSURE: 127 MMHG | DIASTOLIC BLOOD PRESSURE: 80 MMHG | RESPIRATION RATE: 16 BRPM | WEIGHT: 171 LBS | OXYGEN SATURATION: 98 % | HEART RATE: 60 BPM | HEIGHT: 66 IN | BODY MASS INDEX: 27.48 KG/M2

## 2022-12-19 DIAGNOSIS — G47.33 OSA ON CPAP: Primary | ICD-10-CM

## 2022-12-19 DIAGNOSIS — Z99.89 OSA ON CPAP: Primary | ICD-10-CM

## 2022-12-19 PROCEDURE — 99213 OFFICE O/P EST LOW 20 MIN: CPT | Performed by: INTERNAL MEDICINE

## 2022-12-19 PROCEDURE — 3079F DIAST BP 80-89 MM HG: CPT | Performed by: INTERNAL MEDICINE

## 2022-12-19 PROCEDURE — 3074F SYST BP LT 130 MM HG: CPT | Performed by: INTERNAL MEDICINE

## 2022-12-19 PROCEDURE — 3008F BODY MASS INDEX DOCD: CPT | Performed by: INTERNAL MEDICINE

## 2022-12-27 ENCOUNTER — OFFICE VISIT (OUTPATIENT)
Dept: PODIATRY CLINIC | Facility: CLINIC | Age: 62
End: 2022-12-27
Payer: COMMERCIAL

## 2022-12-27 DIAGNOSIS — M79.674 TOE PAIN, CHRONIC, RIGHT: Primary | ICD-10-CM

## 2022-12-27 DIAGNOSIS — L84 CORN OF TOE: ICD-10-CM

## 2022-12-27 DIAGNOSIS — G89.29 TOE PAIN, CHRONIC, RIGHT: Primary | ICD-10-CM

## 2022-12-27 DIAGNOSIS — M20.5X1 ADDUCTOVARUS ROTATION OF TOE, ACQUIRED, RIGHT: ICD-10-CM

## 2022-12-27 PROCEDURE — 99203 OFFICE O/P NEW LOW 30 MIN: CPT | Performed by: STUDENT IN AN ORGANIZED HEALTH CARE EDUCATION/TRAINING PROGRAM

## 2022-12-27 PROCEDURE — 11055 PARING/CUTG B9 HYPRKER LES 1: CPT | Performed by: STUDENT IN AN ORGANIZED HEALTH CARE EDUCATION/TRAINING PROGRAM

## 2023-02-14 DIAGNOSIS — E78.5 HYPERLIPIDEMIA, UNSPECIFIED HYPERLIPIDEMIA TYPE: ICD-10-CM

## 2023-02-14 DIAGNOSIS — E11.9 TYPE 2 DIABETES MELLITUS WITHOUT COMPLICATION, WITHOUT LONG-TERM CURRENT USE OF INSULIN (HCC): Primary | ICD-10-CM

## 2023-03-05 ENCOUNTER — HOSPITAL ENCOUNTER (OUTPATIENT)
Age: 63
Discharge: HOME | End: 2023-03-05
Payer: COMMERCIAL

## 2023-03-05 VITALS
TEMPERATURE: 97 F | OXYGEN SATURATION: 99 % | RESPIRATION RATE: 18 BRPM | SYSTOLIC BLOOD PRESSURE: 125 MMHG | DIASTOLIC BLOOD PRESSURE: 71 MMHG | HEART RATE: 83 BPM

## 2023-03-05 DIAGNOSIS — J32.0 RIGHT MAXILLARY SINUSITIS: Primary | ICD-10-CM

## 2023-03-05 DIAGNOSIS — Z20.822 ENCOUNTER FOR LABORATORY TESTING FOR COVID-19 VIRUS: ICD-10-CM

## 2023-03-05 LAB — SARS-COV-2 RNA RESP QL NAA+PROBE: NOT DETECTED

## 2023-03-05 RX ORDER — AMOXICILLIN AND CLAVULANATE POTASSIUM 875; 125 MG/1; MG/1
1 TABLET, FILM COATED ORAL 2 TIMES DAILY
Qty: 14 TABLET | Refills: 0 | Status: SHIPPED | OUTPATIENT
Start: 2023-03-05 | End: 2023-03-12

## 2023-03-05 NOTE — ED INITIAL ASSESSMENT (HPI)
Pt in [address] [place] [address] for concern for sinus infection. State having HA, sinus pain, eye pain, nose pain and yellow drainage from eye x 1 wk.

## 2023-03-05 NOTE — DISCHARGE INSTRUCTIONS
Augmentin 1 tablet twice a day for 7 days with food  Flonase nasal spray, 2 sprays in each to nostril daily for 2 weeks  Push fluids  Steam showers  If you develop chest pain or shortness of breath please return  If no improvement in 1 week please see your doctor

## 2023-03-07 ENCOUNTER — PATIENT MESSAGE (OUTPATIENT)
Dept: PULMONOLOGY | Facility: CLINIC | Age: 63
End: 2023-03-07

## 2023-03-07 NOTE — TELEPHONE ENCOUNTER
From: Anival Elias  To: Oriana Raymundo. Wong Marin MD  Sent: 3/7/2023 8:35 AM CST  Subject: Sleep Apnea Settings on Machine     Hi Dr Wong Marin,  Please check my records and let me know what my Settings should be on my Cpap machine. I believe the New machine that I received after the REcall is not correct --- When I turn on the machine. What is the main # it should be set on?    thank you Iris pap - [phone]  Can I change this number?

## 2023-03-11 ENCOUNTER — LAB ENCOUNTER (OUTPATIENT)
Dept: LAB | Age: 63
End: 2023-03-11
Attending: INTERNAL MEDICINE
Payer: COMMERCIAL

## 2023-03-11 DIAGNOSIS — E78.5 HYPERLIPIDEMIA, UNSPECIFIED HYPERLIPIDEMIA TYPE: ICD-10-CM

## 2023-03-11 DIAGNOSIS — E11.9 TYPE 2 DIABETES MELLITUS WITHOUT COMPLICATION, WITHOUT LONG-TERM CURRENT USE OF INSULIN (HCC): ICD-10-CM

## 2023-03-11 LAB
ALBUMIN SERPL-MCNC: 3.9 G/DL (ref 3.4–5)
ALBUMIN/GLOB SERPL: 1.3 {RATIO} (ref 1–2)
ALP LIVER SERPL-CCNC: 85 U/L
ALT SERPL-CCNC: 25 U/L
ANION GAP SERPL CALC-SCNC: 3 MMOL/L (ref 0–18)
AST SERPL-CCNC: 16 U/L (ref 15–37)
BILIRUB SERPL-MCNC: 0.3 MG/DL (ref 0.1–2)
BUN BLD-MCNC: 9 MG/DL (ref 7–18)
BUN/CREAT SERPL: 13.2 (ref 10–20)
CALCIUM BLD-MCNC: 9.2 MG/DL (ref 8.5–10.1)
CHLORIDE SERPL-SCNC: 109 MMOL/L (ref 98–112)
CHOLEST SERPL-MCNC: 181 MG/DL (ref ?–200)
CO2 SERPL-SCNC: 28 MMOL/L (ref 21–32)
CREAT BLD-MCNC: 0.68 MG/DL
EST. AVERAGE GLUCOSE BLD GHB EST-MCNC: 111 MG/DL (ref 68–126)
FASTING PATIENT LIPID ANSWER: YES
FASTING STATUS PATIENT QL REPORTED: YES
GFR SERPLBLD BASED ON 1.73 SQ M-ARVRAT: 100 ML/MIN/1.73M2 (ref 60–?)
GLOBULIN PLAS-MCNC: 3.1 G/DL (ref 2.8–4.4)
GLUCOSE BLD-MCNC: 99 MG/DL (ref 70–99)
HBA1C MFR BLD: 5.5 % (ref ?–5.7)
HDLC SERPL-MCNC: 65 MG/DL (ref 40–59)
LDLC SERPL CALC-MCNC: 108 MG/DL (ref ?–100)
NONHDLC SERPL-MCNC: 116 MG/DL (ref ?–130)
OSMOLALITY SERPL CALC.SUM OF ELEC: 289 MOSM/KG (ref 275–295)
POTASSIUM SERPL-SCNC: 4.2 MMOL/L (ref 3.5–5.1)
PROT SERPL-MCNC: 7 G/DL (ref 6.4–8.2)
SODIUM SERPL-SCNC: 140 MMOL/L (ref 136–145)
TRIGL SERPL-MCNC: 38 MG/DL (ref 30–149)
VLDLC SERPL CALC-MCNC: 6 MG/DL (ref 0–30)

## 2023-03-11 PROCEDURE — 80053 COMPREHEN METABOLIC PANEL: CPT

## 2023-03-11 PROCEDURE — 80061 LIPID PANEL: CPT

## 2023-03-11 PROCEDURE — 83036 HEMOGLOBIN GLYCOSYLATED A1C: CPT

## 2023-03-11 PROCEDURE — 36415 COLL VENOUS BLD VENIPUNCTURE: CPT

## 2023-04-13 ENCOUNTER — APPOINTMENT (OUTPATIENT)
Dept: CT IMAGING | Facility: HOSPITAL | Age: 63
End: 2023-04-13
Attending: STUDENT IN AN ORGANIZED HEALTH CARE EDUCATION/TRAINING PROGRAM
Payer: COMMERCIAL

## 2023-04-13 ENCOUNTER — HOSPITAL ENCOUNTER (EMERGENCY)
Facility: HOSPITAL | Age: 63
Discharge: HOME | End: 2023-04-13
Attending: STUDENT IN AN ORGANIZED HEALTH CARE EDUCATION/TRAINING PROGRAM
Payer: COMMERCIAL

## 2023-04-13 VITALS
RESPIRATION RATE: 20 BRPM | OXYGEN SATURATION: 97 % | TEMPERATURE: 98 F | BODY MASS INDEX: 28 KG/M2 | HEART RATE: 62 BPM | WEIGHT: 172 LBS | SYSTOLIC BLOOD PRESSURE: 131 MMHG | DIASTOLIC BLOOD PRESSURE: 78 MMHG

## 2023-04-13 DIAGNOSIS — G43.109 MIGRAINE WITH AURA AND WITHOUT STATUS MIGRAINOSUS, NOT INTRACTABLE: Primary | ICD-10-CM

## 2023-04-13 LAB
ALBUMIN SERPL-MCNC: 4 G/DL (ref 3.4–5)
ALBUMIN/GLOB SERPL: 1.2 {RATIO} (ref 1–2)
ALP LIVER SERPL-CCNC: 107 U/L
ALT SERPL-CCNC: 33 U/L
ANION GAP SERPL CALC-SCNC: 7 MMOL/L (ref 0–18)
AST SERPL-CCNC: 21 U/L (ref 15–37)
ATRIAL RATE: 51 BPM
BASOPHILS # BLD AUTO: 0.02 X10(3) UL (ref 0–0.2)
BASOPHILS NFR BLD AUTO: 0.4 %
BILIRUB SERPL-MCNC: 0.4 MG/DL (ref 0.1–2)
BUN BLD-MCNC: 11 MG/DL (ref 7–18)
BUN/CREAT SERPL: 15.7 (ref 10–20)
CALCIUM BLD-MCNC: 9.1 MG/DL (ref 8.5–10.1)
CHLORIDE SERPL-SCNC: 106 MMOL/L (ref 98–112)
CO2 SERPL-SCNC: 27 MMOL/L (ref 21–32)
CREAT BLD-MCNC: 0.7 MG/DL
DEPRECATED RDW RBC AUTO: 42.4 FL (ref 35.1–46.3)
EOSINOPHIL # BLD AUTO: 0.13 X10(3) UL (ref 0–0.7)
EOSINOPHIL NFR BLD AUTO: 2.8 %
ERYTHROCYTE [DISTWIDTH] IN BLOOD BY AUTOMATED COUNT: 12.4 % (ref 11–15)
GFR SERPLBLD BASED ON 1.73 SQ M-ARVRAT: 98 ML/MIN/1.73M2 (ref 60–?)
GLOBULIN PLAS-MCNC: 3.4 G/DL (ref 2.8–4.4)
GLUCOSE BLD-MCNC: 98 MG/DL (ref 70–99)
GLUCOSE BLDC GLUCOMTR-MCNC: 98 MG/DL (ref 70–99)
HCT VFR BLD AUTO: 42.8 %
HGB BLD-MCNC: 14.1 G/DL
IMM GRANULOCYTES # BLD AUTO: 0.01 X10(3) UL (ref 0–1)
IMM GRANULOCYTES NFR BLD: 0.2 %
LYMPHOCYTES # BLD AUTO: 2.2 X10(3) UL (ref 1–4)
LYMPHOCYTES NFR BLD AUTO: 47.3 %
MCH RBC QN AUTO: 30.7 PG (ref 26–34)
MCHC RBC AUTO-ENTMCNC: 32.9 G/DL (ref 31–37)
MCV RBC AUTO: 93.2 FL
MONOCYTES # BLD AUTO: 0.41 X10(3) UL (ref 0.1–1)
MONOCYTES NFR BLD AUTO: 8.8 %
NEUTROPHILS # BLD AUTO: 1.88 X10 (3) UL (ref 1.5–7.7)
NEUTROPHILS # BLD AUTO: 1.88 X10(3) UL (ref 1.5–7.7)
NEUTROPHILS NFR BLD AUTO: 40.5 %
OSMOLALITY SERPL CALC.SUM OF ELEC: 289 MOSM/KG (ref 275–295)
P AXIS: 47 DEGREES
P-R INTERVAL: 164 MS
PLATELET # BLD AUTO: 271 10(3)UL (ref 150–450)
POTASSIUM SERPL-SCNC: 3.8 MMOL/L (ref 3.5–5.1)
PROT SERPL-MCNC: 7.4 G/DL (ref 6.4–8.2)
Q-T INTERVAL: 416 MS
QRS DURATION: 86 MS
QTC CALCULATION (BEZET): 383 MS
R AXIS: 15 DEGREES
RBC # BLD AUTO: 4.59 X10(6)UL
SODIUM SERPL-SCNC: 140 MMOL/L (ref 136–145)
T AXIS: 31 DEGREES
TROPONIN I HIGH SENSITIVITY: 5 NG/L
VENTRICULAR RATE: 51 BPM
WBC # BLD AUTO: 4.7 X10(3) UL (ref 4–11)

## 2023-04-13 PROCEDURE — 84484 ASSAY OF TROPONIN QUANT: CPT | Performed by: STUDENT IN AN ORGANIZED HEALTH CARE EDUCATION/TRAINING PROGRAM

## 2023-04-13 PROCEDURE — 82962 GLUCOSE BLOOD TEST: CPT

## 2023-04-13 PROCEDURE — 85025 COMPLETE CBC W/AUTO DIFF WBC: CPT | Performed by: STUDENT IN AN ORGANIZED HEALTH CARE EDUCATION/TRAINING PROGRAM

## 2023-04-13 PROCEDURE — 96375 TX/PRO/DX INJ NEW DRUG ADDON: CPT

## 2023-04-13 PROCEDURE — 70450 CT HEAD/BRAIN W/O DYE: CPT | Performed by: STUDENT IN AN ORGANIZED HEALTH CARE EDUCATION/TRAINING PROGRAM

## 2023-04-13 PROCEDURE — 99285 EMERGENCY DEPT VISIT HI MDM: CPT

## 2023-04-13 PROCEDURE — 93005 ELECTROCARDIOGRAM TRACING: CPT

## 2023-04-13 PROCEDURE — 93010 ELECTROCARDIOGRAM REPORT: CPT

## 2023-04-13 PROCEDURE — 96374 THER/PROPH/DIAG INJ IV PUSH: CPT

## 2023-04-13 PROCEDURE — 80053 COMPREHEN METABOLIC PANEL: CPT | Performed by: STUDENT IN AN ORGANIZED HEALTH CARE EDUCATION/TRAINING PROGRAM

## 2023-04-13 RX ORDER — DIPHENHYDRAMINE HYDROCHLORIDE 50 MG/ML
25 INJECTION INTRAMUSCULAR; INTRAVENOUS ONCE
Status: COMPLETED | OUTPATIENT
Start: 2023-04-13 | End: 2023-04-13

## 2023-04-13 RX ORDER — PROCHLORPERAZINE EDISYLATE 5 MG/ML
10 INJECTION INTRAMUSCULAR; INTRAVENOUS ONCE
Status: COMPLETED | OUTPATIENT
Start: 2023-04-13 | End: 2023-04-13

## 2023-04-13 RX ORDER — BUTALBITAL, ACETAMINOPHEN AND CAFFEINE 50; 325; 40 MG/1; MG/1; MG/1
1-2 TABLET ORAL EVERY 6 HOURS PRN
Qty: 10 TABLET | Refills: 0 | Status: SHIPPED | OUTPATIENT
Start: 2023-04-13 | End: 2023-04-18

## 2023-04-13 NOTE — DISCHARGE INSTRUCTIONS
.Thank you for seeking care at [facility] Emergency [facility]. You have been seen and evaluated for a headache. We reviewed the results from your visit in the emergency department. Please read the instructions provided   If provided, take prescriptions as instructed. Remember, your care process does not end after your visit today. Please follow-up with your doctor within 1-2 days for a follow-up check to ensure you are  improving, to see if you need any further evaluation/testing, or to evaluate for any alternate diagnoses. Please return to the emergency department if you develop worsening of your headache or a new headache which is severe, associated with vision changes, difficulty with walking or coordination, difficulty with speech, numbness, tingling or weakness, associated with neck stiffness or fever, nausea or vomiting, if the headache is different from any other headache that you have had before, confusion, or if you develop any other new or concerning symptoms as these could be signs of more serious medical illness. We hope you feel better.

## 2023-04-13 NOTE — ED INITIAL ASSESSMENT (HPI)
Patient complains of posterior head \"tingling\" and dizziness since today, denies chest pain, states it came on abruptly and continues to wax and wane in waves

## 2023-05-08 ENCOUNTER — OFFICE VISIT (OUTPATIENT)
Dept: NEUROLOGY | Facility: CLINIC | Age: 63
End: 2023-05-08
Payer: COMMERCIAL

## 2023-05-08 VITALS — BODY MASS INDEX: 27.32 KG/M2 | WEIGHT: 172 LBS | HEIGHT: 66.5 IN

## 2023-05-08 DIAGNOSIS — R29.818 TRANSIENT NEUROLOGICAL SYMPTOMS: ICD-10-CM

## 2023-05-08 DIAGNOSIS — R42 VERTIGO: ICD-10-CM

## 2023-05-08 DIAGNOSIS — G43.109 MIGRAINE WITH AURA AND WITHOUT STATUS MIGRAINOSUS, NOT INTRACTABLE: Primary | ICD-10-CM

## 2023-05-08 PROCEDURE — 3008F BODY MASS INDEX DOCD: CPT | Performed by: OTHER

## 2023-05-08 PROCEDURE — 99204 OFFICE O/P NEW MOD 45 MIN: CPT | Performed by: OTHER

## 2023-07-28 ENCOUNTER — APPOINTMENT (OUTPATIENT)
Dept: GENERAL RADIOLOGY | Age: 63
End: 2023-07-28
Attending: NURSE PRACTITIONER
Payer: COMMERCIAL

## 2023-07-28 ENCOUNTER — HOSPITAL ENCOUNTER (OUTPATIENT)
Age: 63
Discharge: HOME | End: 2023-07-28
Payer: COMMERCIAL

## 2023-07-28 VITALS
TEMPERATURE: 98 F | HEART RATE: 66 BPM | DIASTOLIC BLOOD PRESSURE: 84 MMHG | SYSTOLIC BLOOD PRESSURE: 128 MMHG | OXYGEN SATURATION: 95 % | RESPIRATION RATE: 16 BRPM

## 2023-07-28 DIAGNOSIS — T14.90XA TRAUMA: Primary | ICD-10-CM

## 2023-07-28 DIAGNOSIS — M85.88 OSTEOPENIA OF OTHER SITE: ICD-10-CM

## 2023-07-28 DIAGNOSIS — S63.501A SPRAIN OF RIGHT WRIST, INITIAL ENCOUNTER: ICD-10-CM

## 2023-07-28 PROCEDURE — 99213 OFFICE O/P EST LOW 20 MIN: CPT | Performed by: NURSE PRACTITIONER

## 2023-07-28 PROCEDURE — 73130 X-RAY EXAM OF HAND: CPT | Performed by: NURSE PRACTITIONER

## 2023-07-28 PROCEDURE — L3924 HFO WITHOUT JOINTS PRE OTS: HCPCS | Performed by: NURSE PRACTITIONER

## 2023-07-28 PROCEDURE — 73110 X-RAY EXAM OF WRIST: CPT | Performed by: NURSE PRACTITIONER

## 2023-07-28 RX ORDER — NAPROXEN 500 MG/1
500 TABLET ORAL 2 TIMES DAILY WITH MEALS
Qty: 28 TABLET | Refills: 0 | Status: SHIPPED | OUTPATIENT
Start: 2023-07-28 | End: 2023-08-11

## 2023-07-28 NOTE — DISCHARGE INSTRUCTIONS
There is no fracture appreciated on x-ray. Likely wrist sprain. You may wear Velcro wrist splint for the next 2 weeks. Remove while sleeping and resting. Elevate extremity while sleeping and resting. Apply ice 4 times a day for least 15 minutes. Tylenol as needed for breakthrough pain. Will prescribe naproxen, you may take this medication twice a day for the next 2 weeks. This medication is similar to Motrin, Advil, Aleve, ibuprofen, please do not take these medications while taking naproxen. If you have no improvement of pain or symptoms in 2 weeks with above therapy, you may follow-up with orthopedic physician.

## 2023-11-18 ENCOUNTER — HOSPITAL ENCOUNTER (OUTPATIENT)
Dept: MRI IMAGING | Facility: HOSPITAL | Age: 63
Discharge: HOME | End: 2023-11-18
Attending: Other
Payer: COMMERCIAL

## 2023-11-18 DIAGNOSIS — R51.9 HEADACHE, ACUTE: ICD-10-CM

## 2023-12-06 ENCOUNTER — APPOINTMENT (OUTPATIENT)
Dept: GENERAL RADIOLOGY | Age: 63
End: 2023-12-06
Attending: PHYSICIAN ASSISTANT
Payer: COMMERCIAL

## 2023-12-06 ENCOUNTER — HOSPITAL ENCOUNTER (OUTPATIENT)
Age: 63
Discharge: HOME | End: 2023-12-06
Payer: COMMERCIAL

## 2023-12-06 VITALS
TEMPERATURE: 98 F | SYSTOLIC BLOOD PRESSURE: 145 MMHG | HEART RATE: 74 BPM | DIASTOLIC BLOOD PRESSURE: 86 MMHG | OXYGEN SATURATION: 96 % | RESPIRATION RATE: 18 BRPM

## 2023-12-06 DIAGNOSIS — R05.9 COUGH: Primary | ICD-10-CM

## 2023-12-06 DIAGNOSIS — B34.9 VIRAL ILLNESS: ICD-10-CM

## 2023-12-06 LAB
S PYO AG THROAT QL: NEGATIVE
SARS-COV-2 RNA RESP QL NAA+PROBE: NOT DETECTED

## 2023-12-06 PROCEDURE — U0002 COVID-19 LAB TEST NON-CDC: HCPCS | Performed by: PHYSICIAN ASSISTANT

## 2023-12-06 PROCEDURE — 87880 STREP A ASSAY W/OPTIC: CPT | Performed by: PHYSICIAN ASSISTANT

## 2023-12-06 PROCEDURE — 99213 OFFICE O/P EST LOW 20 MIN: CPT | Performed by: PHYSICIAN ASSISTANT

## 2023-12-06 PROCEDURE — 71046 X-RAY EXAM CHEST 2 VIEWS: CPT | Performed by: PHYSICIAN ASSISTANT

## 2023-12-06 RX ORDER — BENZONATATE 100 MG/1
100 CAPSULE ORAL 3 TIMES DAILY PRN
Qty: 15 CAPSULE | Refills: 0 | Status: SHIPPED | OUTPATIENT
Start: 2023-12-06 | End: 2023-12-11

## 2023-12-08 ENCOUNTER — HOSPITAL ENCOUNTER (OUTPATIENT)
Dept: MRI IMAGING | Age: 63
Discharge: HOME | End: 2023-12-08
Attending: Other
Payer: COMMERCIAL

## 2023-12-08 DIAGNOSIS — R29.818 TRANSIENT NEUROLOGICAL SYMPTOMS: ICD-10-CM

## 2023-12-08 PROCEDURE — 70553 MRI BRAIN STEM W/O & W/DYE: CPT | Performed by: OTHER

## 2023-12-08 PROCEDURE — A9575 INJ GADOTERATE MEGLUMI 0.1ML: HCPCS | Performed by: OTHER

## 2023-12-08 PROCEDURE — 70544 MR ANGIOGRAPHY HEAD W/O DYE: CPT | Performed by: OTHER

## 2023-12-08 RX ORDER — GADOTERATE MEGLUMINE 376.9 MG/ML
20 INJECTION INTRAVENOUS
Status: COMPLETED | OUTPATIENT
Start: 2023-12-08 | End: 2023-12-08

## 2023-12-08 RX ADMIN — GADOTERATE MEGLUMINE 16 ML: 376.9 INJECTION INTRAVENOUS at 13:08:00

## 2023-12-11 ENCOUNTER — TELEPHONE (OUTPATIENT)
Dept: NEUROLOGY | Facility: CLINIC | Age: 63
End: 2023-12-11

## 2023-12-11 NOTE — TELEPHONE ENCOUNTER
----- Message from Zaynab Medina MD sent at 12/11/2023  7:44 AM CST -----  Please let patient know that MRI brain didn't show significant abnormalities.

## 2023-12-18 ENCOUNTER — OFFICE VISIT (OUTPATIENT)
Dept: PULMONOLOGY | Facility: CLINIC | Age: 63
End: 2023-12-18

## 2023-12-18 VITALS
SYSTOLIC BLOOD PRESSURE: 111 MMHG | BODY MASS INDEX: 28.79 KG/M2 | HEIGHT: 65.75 IN | OXYGEN SATURATION: 98 % | HEART RATE: 70 BPM | DIASTOLIC BLOOD PRESSURE: 73 MMHG | WEIGHT: 177 LBS

## 2023-12-18 DIAGNOSIS — G47.33 OSA ON CPAP: Primary | ICD-10-CM

## 2023-12-18 PROCEDURE — 3078F DIAST BP <80 MM HG: CPT | Performed by: INTERNAL MEDICINE

## 2023-12-18 PROCEDURE — 99214 OFFICE O/P EST MOD 30 MIN: CPT | Performed by: INTERNAL MEDICINE

## 2023-12-18 PROCEDURE — 3008F BODY MASS INDEX DOCD: CPT | Performed by: INTERNAL MEDICINE

## 2023-12-18 PROCEDURE — 3074F SYST BP LT 130 MM HG: CPT | Performed by: INTERNAL MEDICINE

## 2023-12-18 NOTE — PROGRESS NOTES
Subjective:   Patient ID: June Etienne is a 66year old female. HPI  Doing very well with CPAP use every night and compliant and vigilant about using it every night and all night  No significant technical issue or air leak or sinus pressure  Regular time in bed  No residual daytime sleepiness or fatigue  Otherwise denied chest pain or shortness of breath or cough  History/Other:   Review of Systems   Constitutional: Negative. HENT: Negative. Respiratory: Negative. Cardiovascular: Negative. Skin: Negative. Neurological: Negative. Hematological: Negative. Psychiatric/Behavioral: Negative. Current Outpatient Medications   Medication Sig Dispense Refill    Vitamin B-12 500 MCG Oral Tab Take 1 tablet (500 mcg total) by mouth daily. Multiple Vitamins-Minerals (MULTI VITAMIN/MINERALS) Oral Tab Take by mouth. ALLEGRA 180 MG OR TABS 1 TABLET DAILY      carbamide peroxide 6.5 % Otic Solution Place 5 drops into the right ear as needed. (Patient not taking: Reported on 7/28/2023) 15 mL 0    benzonatate 100 MG Oral Cap Take 1 capsule (100 mg total) by mouth 3 (three) times daily as needed for cough (May cause drowsiness no driving or operating machinery while taking this medication). (Patient not taking: Reported on 7/28/2023) 12 capsule 0    diphenhydrAMINE (BENADRYL ALLERGY) 25 MG Oral Cap Take 1 capsule (25 mg total) by mouth every 6 (six) hours as needed. (Patient not taking: Reported on 7/28/2023) 20 capsule 0    Saline (AYR NASAL MIST ALLERGY/SINUS) 2.65 % Nasal Solution 1 spray by Nasal route 3 (three) times daily. (Patient not taking: Reported on 7/28/2023) 50 mL 0    tretinoin 0.025 % External Cream Apply a pea-sized amount to the entire face every other night for 2 wks then every night thereafter (Patient not taking: Reported on 7/28/2023) 30 g 4     Allergies:   Allergies   Allergen Reactions    Mupirocin ITCHING and RASH    Bacitracin RASH    Latex RASH Objective:   Physical Exam  Constitutional:       General: She is not in acute distress. Appearance: Normal appearance. She is not ill-appearing. HENT:      Head: Normocephalic and atraumatic. Right Ear: Tympanic membrane normal.      Nose: Nose normal.      Mouth/Throat:      Mouth: Mucous membranes are moist.   Eyes:      General: No scleral icterus. Cardiovascular:      Rate and Rhythm: Normal rate. Heart sounds: No murmur heard. No gallop. Pulmonary:      Effort: Pulmonary effort is normal. No respiratory distress. Breath sounds: No stridor. No wheezing, rhonchi or rales. Abdominal:      General: Abdomen is flat. Bowel sounds are normal. There is no distension. Palpations: Abdomen is soft. Tenderness: There is no guarding. Musculoskeletal:      Cervical back: Normal range of motion. No rigidity. Right lower leg: No edema. Left lower leg: No edema. Lymphadenopathy:      Cervical: No cervical adenopathy. Skin:     General: Skin is dry. Neurological:      General: No focal deficit present. Mental Status: She is oriented to person, place, and time. Mental status is at baseline. Assessment & Plan:   1.  CORDELIA on CPAP        1- severe CORDELIA   Clinically much better on CPAP therapy  Excellent subjective and objective compliance  I reviewed her download data with 100% adherence  Effective therapy with normal AHI     Avoid sedative / narcotics / alcohol   Avoid driving if sleepy   Diet / exercise   Maintain regular sleep-awake cycles      F/u in one year     Meds This Visit:  Requested Prescriptions      No prescriptions requested or ordered in this encounter       Imaging & Referrals:  None

## 2024-01-28 ENCOUNTER — HOSPITAL ENCOUNTER (OUTPATIENT)
Age: 64
Discharge: HOME | End: 2024-01-28
Payer: COMMERCIAL

## 2024-01-28 VITALS
SYSTOLIC BLOOD PRESSURE: 138 MMHG | TEMPERATURE: 98 F | RESPIRATION RATE: 18 BRPM | DIASTOLIC BLOOD PRESSURE: 76 MMHG | HEART RATE: 73 BPM | OXYGEN SATURATION: 96 %

## 2024-01-28 DIAGNOSIS — J01.40 ACUTE NON-RECURRENT PANSINUSITIS: Primary | ICD-10-CM

## 2024-01-28 DIAGNOSIS — H66.001 ACUTE SUPPURATIVE OTITIS MEDIA OF RIGHT EAR WITHOUT SPONTANEOUS RUPTURE OF TYMPANIC MEMBRANE, RECURRENCE NOT SPECIFIED: ICD-10-CM

## 2024-01-28 LAB
POCT INFLUENZA A: NEGATIVE
POCT INFLUENZA B: NEGATIVE
S PYO AG THROAT QL: NEGATIVE
SARS-COV-2 RNA RESP QL NAA+PROBE: NOT DETECTED

## 2024-01-28 PROCEDURE — U0002 COVID-19 LAB TEST NON-CDC: HCPCS | Performed by: NURSE PRACTITIONER

## 2024-01-28 PROCEDURE — 87880 STREP A ASSAY W/OPTIC: CPT | Performed by: NURSE PRACTITIONER

## 2024-01-28 PROCEDURE — 87502 INFLUENZA DNA AMP PROBE: CPT | Performed by: NURSE PRACTITIONER

## 2024-01-28 PROCEDURE — 99213 OFFICE O/P EST LOW 20 MIN: CPT | Performed by: NURSE PRACTITIONER

## 2024-01-28 RX ORDER — CODEINE PHOSPHATE/GUAIFENESIN 10-100MG/5
5 LIQUID (ML) ORAL EVERY 6 HOURS PRN
Qty: 200 ML | Refills: 0 | Status: SHIPPED | OUTPATIENT
Start: 2024-01-28 | End: 2024-02-11

## 2024-01-28 RX ORDER — AMOXICILLIN AND CLAVULANATE POTASSIUM 875; 125 MG/1; MG/1
1 TABLET, FILM COATED ORAL 2 TIMES DAILY
Qty: 20 TABLET | Refills: 0 | Status: SHIPPED | OUTPATIENT
Start: 2024-01-28 | End: 2024-02-07

## 2024-01-28 NOTE — ED PROVIDER NOTES
Patient Seen in: [facility]      History     Chief Complaint   Patient presents with    Cough/URI     Stated Complaint: Sinus Pain, Ear Pain    Subjective:   HPI    This is a well-appearing 63-year-old female with no significant past medical history presents with cough, sore throat, ear pain, chills, body aches, purulent rhinorrhea over the last 6 days.  No documented fever.  Reporting right ear pain, no mastoid tenderness, no drainage from the ear.  Denies any posterior neck pain or neck stiffness.  No rashes.  Is having cough, worse at night.  No shortness of breath or chest pain.  States multiple over-the-counter medications without relief in symptoms.    Objective:   No pertinent past medical history.            No pertinent past surgical history.              No pertinent social history.            Review of Systems   HENT:  Positive for congestion, rhinorrhea, sinus pressure, sinus pain and sore throat.    Respiratory:  Positive for cough.    All other systems reviewed and are negative.      Positive for stated complaint: Sinus Pain, Ear Pain  Other systems are as noted in HPI.  Constitutional and vital signs reviewed.      All other systems reviewed and negative except as noted above.    Physical Exam     ED Triage Vitals [01/28/24 1105]   /76   Pulse 73   Resp 18   Temp 98 °F (36.7 °C)   Temp src Oral   SpO2 96 %   O2 Device None (Room air)       Current:/76   Pulse 73   Temp 98 °F (36.7 °C) (Oral)   Resp 18   SpO2 96%         Physical Exam  Vitals and nursing note reviewed.   Constitutional:       General: She is awake. She is not in acute distress.     Appearance: Normal appearance. She is not ill-appearing, toxic-appearing or diaphoretic.   HENT:      Head: Normocephalic and atraumatic.      Right Ear: Ear canal and external ear normal. Tympanic membrane is erythematous and bulging.      Left Ear: Tympanic membrane, ear canal and external ear normal.      Nose: Rhinorrhea  present. Rhinorrhea is purulent.      Right Sinus: Maxillary sinus tenderness and frontal sinus tenderness present.      Left Sinus: Maxillary sinus tenderness and frontal sinus tenderness present.      Mouth/Throat:      Lips: Pink.      Mouth: Mucous membranes are moist.      Pharynx: Oropharynx is clear. Uvula midline. No pharyngeal swelling, oropharyngeal exudate, posterior oropharyngeal erythema or uvula swelling.      Tonsils: No tonsillar exudate or tonsillar abscesses.   Eyes:      General: Lids are normal.      Extraocular Movements: Extraocular movements intact.      Conjunctiva/sclera: Conjunctivae normal.      Pupils: Pupils are equal, round, and reactive to light.   Cardiovascular:      Rate and Rhythm: Normal rate and regular rhythm.      Pulses: Normal pulses.      Heart sounds: Normal heart sounds.   Pulmonary:      Effort: Pulmonary effort is normal.      Breath sounds: Normal breath sounds and air entry. No stridor, decreased air movement or transmitted upper airway sounds.   Musculoskeletal:      Cervical back: Full passive range of motion without pain and normal range of motion.   Skin:     General: Skin is warm and dry.      Capillary Refill: Capillary refill takes less than 2 seconds.   Neurological:      General: No focal deficit present.      Mental Status: She is alert and oriented to person, place, and time.   Psychiatric:         Mood and Affect: Mood normal.         Behavior: Behavior normal. Behavior is cooperative.         Thought Content: Thought content normal.         Judgment: Judgment normal.       ED Course     Labs Reviewed   POCT RAPID STREP - [facility]   POCT FLU TEST - [facility]    Narrative:     This assay is a rapid molecular in vitro test utilizing nucleic acid amplification of influenza A and B viral RNA.   RAPID SARS-COV-2 BY PCR - [facility]     COVID-negative, influenza negative, strep negative.    MDM       Medical Decision Making  Exam, nontoxic in appearance, exam as noted  above.  Differential diagnoses including but not limited to otitis media, otitis externa, sinusitis, pneumonia, influenza, viral versus bacterial pharyngitis, COVID.  COVID, strep and influenza here were all negative.  Patient with clear speech, easy respirations, no drooling.  I did discuss with the patient right TM consistent with otitis media.  Will treat for sinusitis and otitis media with Augmentin which she has tolerated in the past.  Guaifenesin with codeine for her to take at night, cool-mist humidifier at night, sleep with the head of the bed elevated.  Patient is not hypoxic, not tachycardic, do not believe any imaging is warranted at this time.  Close follow-up with her primary care provider was recommended.  Strict ER precautions given.  Patient verbalized plan of care and stated understanding.    Amount and/or Complexity of Data Reviewed  ECG/medicine tests: ordered and independent interpretation performed. Decision-making details documented in ED Course.    Risk  OTC drugs.  Prescription drug management.        Disposition and Plan     Clinical Impression:  1. Acute non-recurrent pansinusitis    2. Acute suppurative otitis media of right ear without spontaneous rupture of tympanic membrane, recurrence not specified         Disposition:  Discharge  1/28/2024 11:42 am    Follow-up:  Jose Bell MD  [address] [facility] [address]  [facility] [id number] [phone]                Medications Prescribed:  Discharge Medication List as of 1/28/2024 11:41 AM

## 2024-01-28 NOTE — DISCHARGE INSTRUCTIONS
Please increase fluids and make sure you are staying hydrated.  Cool-mist humidifier at night, sleep with the head of the bed elevated.  Any worsening symptoms please go to the emergency department.

## 2024-02-05 ENCOUNTER — OFFICE VISIT (OUTPATIENT)
Dept: OTOLARYNGOLOGY | Facility: CLINIC | Age: 64
End: 2024-02-05
Payer: COMMERCIAL

## 2024-02-05 VITALS — WEIGHT: 179 LBS | HEIGHT: 65.75 IN | BODY MASS INDEX: 29.11 KG/M2

## 2024-02-05 DIAGNOSIS — H91.91 HEARING LOSS OF RIGHT EAR, UNSPECIFIED HEARING LOSS TYPE: ICD-10-CM

## 2024-02-05 DIAGNOSIS — J01.90 ACUTE BACTERIAL SINUSITIS: Primary | ICD-10-CM

## 2024-02-05 DIAGNOSIS — B96.89 ACUTE BACTERIAL SINUSITIS: Primary | ICD-10-CM

## 2024-02-05 PROCEDURE — 99213 OFFICE O/P EST LOW 20 MIN: CPT | Performed by: SPECIALIST

## 2024-02-05 PROCEDURE — 3008F BODY MASS INDEX DOCD: CPT | Performed by: SPECIALIST

## 2024-02-05 RX ORDER — AZITHROMYCIN 250 MG/1
TABLET, FILM COATED ORAL
Qty: 11 TABLET | Refills: 0 | Status: SHIPPED | OUTPATIENT
Start: 2024-02-05

## 2024-02-05 RX ORDER — ESTRADIOL 0.1 MG/G
CREAM VAGINAL
COMMUNITY
Start: 2023-10-30

## 2024-02-05 RX ORDER — PREDNISONE 20 MG/1
20 TABLET ORAL DAILY
Qty: 3 TABLET | Refills: 0 | Status: SHIPPED | OUTPATIENT
Start: 2024-02-05

## 2024-02-05 RX ORDER — FLUTICASONE PROPIONATE 50 MCG
SPRAY, SUSPENSION (ML) NASAL
COMMUNITY
Start: 2024-01-02

## 2024-02-06 NOTE — PROGRESS NOTES
Cate Clayton is a 63 year old female.   Chief Complaint   Patient presents with    Ear Problem     Right ear feels clogged    Sinus Problem     Sinus infection-1/28/24 antibiotic for 7 days      HPI:   Patient here for follow-up on sinusitis had tried Augmentin for 8 days which does not appear to be working.    Current Outpatient Medications   Medication Sig Dispense Refill    estradiol 0.1 MG/GM Vaginal Cream APPLY 1-2 GRAM TO AFFECTED AREA AS NEEDED      fluticasone propionate 50 MCG/ACT Nasal Suspension       predniSONE 20 MG Oral Tab Take 1 tablet (20 mg total) by mouth daily. 3 tablet 0    azithromycin (ZITHROMAX Z-ALINE) 250 MG Oral Tab Take 1 by oral route every day for 10 days. 2 tablets today. 11 tablet 0    amoxicillin clavulanate 875-125 MG Oral Tab Take 1 tablet by mouth 2 (two) times daily for 10 days. 20 tablet 0    guaiFENesin-Codeine 100-10 MG/5ML Oral Syrup Take 5 mL by mouth every 6 (six) hours as needed. 200 mL 0    carbamide peroxide 6.5 % Otic Solution Place 5 drops into the right ear as needed. 15 mL 0    diphenhydrAMINE (BENADRYL ALLERGY) 25 MG Oral Cap Take 1 capsule (25 mg total) by mouth every 6 (six) hours as needed. 20 capsule 0    Vitamin B-12 500 MCG Oral Tab Take 1 tablet (500 mcg total) by mouth daily.      Saline (AYR NASAL MIST ALLERGY/SINUS) 2.65 % Nasal Solution 1 spray by Nasal route 3 (three) times daily. 50 mL 0    tretinoin 0.025 % External Cream Apply a pea-sized amount to the entire face every other night for 2 wks then every night thereafter 30 g 4    Multiple Vitamins-Minerals (MULTI VITAMIN/MINERALS) Oral Tab Take by mouth.      ALLEGRA 180 MG OR TABS 1 TABLET DAILY      benzonatate 100 MG Oral Cap Take 1 capsule (100 mg total) by mouth 3 (three) times daily as needed for cough (May cause drowsiness no driving or operating machinery while taking this medication). (Patient not taking: Reported on 7/28/2023) 12 capsule 0      Past Medical History:   Diagnosis Date     Sleep apnea       Social History:  Social History     Socioeconomic History    Marital status:    Tobacco Use    Smoking status: Former     Packs/day: 1.00     Years: 25.00     Additional pack years: 0.00     Total pack years: 25.00     Types: Cigarettes     Quit date: 2005     Years since quittin.7    Smokeless tobacco: Never   Substance and Sexual Activity    Alcohol use: Yes     Alcohol/week: 0.0 standard drinks of alcohol     Comment: Socially    Drug use: No        REVIEW OF SYSTEMS:   GENERAL HEALTH: feels well otherwise  GENERAL : denies fever, chills, sweats, weight loss, weight gain  SKIN: denies any unusual skin lesions or rashes  RESPIRATORY: denies shortness of breath with exertion  NEURO: denies headaches    EXAM:   Ht 5' 5.75\" (1.67 m)   Wt 179 lb (81.2 kg)   BMI 29.11 kg/m²   System Details   Skin Inspection - Normal.   Constitutional Overall appearance - Normal.   Head/Face Facial features - Normal. Eyebrows - Normal. Skull - Normal.   Eyes Conjunctiva - Right: Normal, Left: Normal. Pupil - Right: Normal, Left: Normal.    Ears Inspection - Right: Normal, Left: Normal.   Canal - Right: Normal, Left: Normal.   TM -tympanosclerosis right greater than left.   Nasal External nose - Normal.   Nasal septum - Normal.  Turbinates -severe congestion.  Purulent postnasal drainage.   Oral/Oropharynx Lips - Normal, Tonsils - Normal, Tongue - Normal    Neck Exam Inspection - Normal. Palpation - Normal. Parotid gland - Normal. Thyroid gland - Normal.   Lymph Detail Submental. Submandibular. Anterior cervical. Posterior cervical. Supraclavicular all without enlargement   Psychiatric Orientation - Oriented to time, place, person & situation. Appropriate mood and affect.   Neurological Memory - Normal. Cranial nerves - Cranial nerves II through XII grossly intact.     ASSESSMENT AND PLAN:   1. Acute bacterial sinusitis  Patient changed from Augmentin to azithromycin and prednisone.  Follow-up in 3  weeks time, sooner if problems.      2. Hearing loss of right ear, unspecified hearing loss type  Patient continues to feel that the right ear is decreased over the left an audiogram will be ordered on the time of her repeat visit.      The patient indicates understanding of these issues and agrees to the plan.      Lola Morales MD  2/5/2024  8:21 PM

## 2024-02-06 NOTE — PATIENT INSTRUCTIONS
You were changed from Augmentin to Zithromycin and prednisone.  Follow-up in 3 weeks time, sooner if problems.  If you continue to have right greater than left hearing loss an audiogram will be ordered at that point in time.

## 2024-02-26 ENCOUNTER — OFFICE VISIT (OUTPATIENT)
Dept: AUDIOLOGY | Facility: CLINIC | Age: 64
End: 2024-02-26

## 2024-02-26 ENCOUNTER — OFFICE VISIT (OUTPATIENT)
Dept: OTOLARYNGOLOGY | Facility: CLINIC | Age: 64
End: 2024-02-26

## 2024-02-26 VITALS — WEIGHT: 179 LBS | BODY MASS INDEX: 29.11 KG/M2 | HEIGHT: 65.75 IN

## 2024-02-26 DIAGNOSIS — H91.91 HEARING LOSS OF RIGHT EAR, UNSPECIFIED HEARING LOSS TYPE: Primary | ICD-10-CM

## 2024-02-26 DIAGNOSIS — J01.90 ACUTE BACTERIAL SINUSITIS: ICD-10-CM

## 2024-02-26 DIAGNOSIS — B96.89 ACUTE BACTERIAL SINUSITIS: ICD-10-CM

## 2024-02-26 DIAGNOSIS — H69.91 EUSTACHIAN TUBE DISORDER, RIGHT: Primary | ICD-10-CM

## 2024-02-26 DIAGNOSIS — H69.93 DYSFUNCTION OF BOTH EUSTACHIAN TUBES: ICD-10-CM

## 2024-02-26 PROCEDURE — 99213 OFFICE O/P EST LOW 20 MIN: CPT | Performed by: SPECIALIST

## 2024-02-26 PROCEDURE — 3008F BODY MASS INDEX DOCD: CPT | Performed by: SPECIALIST

## 2024-02-26 PROCEDURE — 92557 COMPREHENSIVE HEARING TEST: CPT | Performed by: AUDIOLOGIST

## 2024-02-26 PROCEDURE — 92567 TYMPANOMETRY: CPT | Performed by: AUDIOLOGIST

## 2024-02-26 NOTE — PATIENT INSTRUCTIONS
You had a resolution of your sinusitis after the Zithromycin and prednisone.  Your audiogram showed some low-frequency hearing loss in the right greater than left ears with negative pressure in the right greater than left ears.  Take Allegra daily for 2 weeks to see if this improves your symptoms.  Follow-up in 1 year's time to get a repeat audiogram, sooner if problems.

## 2024-02-26 NOTE — PROGRESS NOTES
Cate Calyton is a 63 year old female.   Chief Complaint   Patient presents with    Follow - Up     acute bacterial sinusitis     HPI:   Sinus and ear feel improved over last visit on 2/5/2024.  Patient did develop COVID on 2/14/2024.    Current Outpatient Medications   Medication Sig Dispense Refill    estradiol 0.1 MG/GM Vaginal Cream APPLY 1-2 GRAM TO AFFECTED AREA AS NEEDED      fluticasone propionate 50 MCG/ACT Nasal Suspension       carbamide peroxide 6.5 % Otic Solution Place 5 drops into the right ear as needed. 15 mL 0    diphenhydrAMINE (BENADRYL ALLERGY) 25 MG Oral Cap Take 1 capsule (25 mg total) by mouth every 6 (six) hours as needed. 20 capsule 0    Vitamin B-12 500 MCG Oral Tab Take 1 tablet (500 mcg total) by mouth daily.      Saline (AYR NASAL MIST ALLERGY/SINUS) 2.65 % Nasal Solution 1 spray by Nasal route 3 (three) times daily. 50 mL 0    tretinoin 0.025 % External Cream Apply a pea-sized amount to the entire face every other night for 2 wks then every night thereafter 30 g 4    Multiple Vitamins-Minerals (MULTI VITAMIN/MINERALS) Oral Tab Take by mouth.      ALLEGRA 180 MG OR TABS 1 TABLET DAILY      predniSONE 20 MG Oral Tab Take 1 tablet (20 mg total) by mouth daily. (Patient not taking: Reported on 2/26/2024) 3 tablet 0    azithromycin (ZITHROMAX Z-ALINE) 250 MG Oral Tab Take 1 by oral route every day for 10 days. 2 tablets today. (Patient not taking: Reported on 2/26/2024) 11 tablet 0    benzonatate 100 MG Oral Cap Take 1 capsule (100 mg total) by mouth 3 (three) times daily as needed for cough (May cause drowsiness no driving or operating machinery while taking this medication). (Patient not taking: Reported on 7/28/2023) 12 capsule 0      Past Medical History:   Diagnosis Date    Sleep apnea       Social History:  Social History     Socioeconomic History    Marital status:    Tobacco Use    Smoking status: Former     Packs/day: 1.00     Years: 25.00     Additional pack years:  0.00     Total pack years: 25.00     Types: Cigarettes     Quit date: 2005     Years since quittin.8    Smokeless tobacco: Never   Substance and Sexual Activity    Alcohol use: Yes     Alcohol/week: 0.0 standard drinks of alcohol     Comment: Socially    Drug use: No        REVIEW OF SYSTEMS:   GENERAL HEALTH: feels well otherwise  GENERAL : denies fever, chills, sweats, weight loss, weight gain  SKIN: denies any unusual skin lesions or rashes  RESPIRATORY: denies shortness of breath with exertion  NEURO: denies headaches    EXAM:   Ht 5' 5.75\" (1.67 m)   Wt 179 lb (81.2 kg)   BMI 29.11 kg/m²   System Details   Skin Inspection - Normal.   Constitutional Overall appearance - Normal.   Head/Face Facial features - Normal. Eyebrows - Normal. Skull - Normal.   Eyes Conjunctiva - Right: Normal, Left: Normal. Pupil - Right: Normal, Left: Normal.    Ears Inspection - Right: Normal, Left: Normal.   Canal - Right: Normal, Left: Normal.   TM - Right: Normal, Left: Normal.  Tuning fork 512 Hz right equals left   Nasal External nose - Normal.   Nasal septum - Normal.  Turbinates -congestion but no purulence seen   Oral/Oropharynx Lips - Normal, Tonsils - Normal, Tongue - Normal    Neck Exam Inspection - Normal. Palpation - Normal. Parotid gland - Normal. Thyroid gland - Normal.   Lymph Detail Submental. Submandibular. Anterior cervical. Posterior cervical. Supraclavicular all without enlargement   Psychiatric Orientation - Oriented to time, place, person & situation. Appropriate mood and affect.   Neurological Memory - Normal. Cranial nerves - Cranial nerves II through XII grossly intact.     ASSESSMENT AND PLAN:   1. Hearing loss of right ear, unspecified hearing loss type  Audiogram shows a low-frequency predominantly conductive looking hearing loss.  Word discrimination score 100% bilaterally.  Negative pressure in the right greater than left ears.  Audiogram was reviewed with the patient at the time of her visit.   She was also given a copy of the audiogram.  - Audiology Referral - [place] ([facility])    2. Acute bacterial sinusitis  Resolved after prednisone and Zithromycin    3. Dysfunction of both eustachian tubes  Patient to try Allegra daily for 2 weeks.  Follow-up in 1 year's time to repeat the audiogram for the asymmetry, sooner if problems.        The patient indicates understanding of these issues and agrees to the plan.      Lola Morales MD  2/26/2024  11:44 AM

## 2024-03-04 DIAGNOSIS — E11.9 TYPE 2 DIABETES MELLITUS WITHOUT COMPLICATION, WITHOUT LONG-TERM CURRENT USE OF INSULIN (HCC): Primary | ICD-10-CM

## 2024-03-05 ENCOUNTER — LAB ENCOUNTER (OUTPATIENT)
Dept: LAB | Age: 64
End: 2024-03-05
Attending: INTERNAL MEDICINE
Payer: COMMERCIAL

## 2024-03-05 DIAGNOSIS — E11.9 TYPE 2 DIABETES MELLITUS WITHOUT COMPLICATION, WITHOUT LONG-TERM CURRENT USE OF INSULIN (HCC): ICD-10-CM

## 2024-03-05 LAB
CHOLEST SERPL-MCNC: 194 MG/DL (ref ?–200)
EST. AVERAGE GLUCOSE BLD GHB EST-MCNC: 114 MG/DL (ref 68–126)
FASTING PATIENT LIPID ANSWER: YES
HBA1C MFR BLD: 5.6 % (ref ?–5.7)
HDLC SERPL-MCNC: 59 MG/DL (ref 40–59)
LDLC SERPL CALC-MCNC: 122 MG/DL (ref ?–100)
NONHDLC SERPL-MCNC: 135 MG/DL (ref ?–130)
TRIGL SERPL-MCNC: 68 MG/DL (ref 30–149)
VLDLC SERPL CALC-MCNC: 12 MG/DL (ref 0–30)

## 2024-03-05 PROCEDURE — 80061 LIPID PANEL: CPT

## 2024-03-05 PROCEDURE — 36415 COLL VENOUS BLD VENIPUNCTURE: CPT

## 2024-03-05 PROCEDURE — 83036 HEMOGLOBIN GLYCOSYLATED A1C: CPT

## 2024-03-27 ENCOUNTER — OFFICE VISIT (OUTPATIENT)
Dept: NEUROLOGY | Facility: CLINIC | Age: 64
End: 2024-03-27
Payer: COMMERCIAL

## 2024-03-27 VITALS — HEIGHT: 66 IN | WEIGHT: 173 LBS | BODY MASS INDEX: 27.8 KG/M2

## 2024-03-27 DIAGNOSIS — R42 VERTIGO: ICD-10-CM

## 2024-03-27 DIAGNOSIS — G43.109 MIGRAINE WITH AURA AND WITHOUT STATUS MIGRAINOSUS, NOT INTRACTABLE: Primary | ICD-10-CM

## 2024-03-27 DIAGNOSIS — R29.818 TRANSIENT NEUROLOGICAL SYMPTOMS: ICD-10-CM

## 2024-03-27 PROCEDURE — 99214 OFFICE O/P EST MOD 30 MIN: CPT | Performed by: OTHER

## 2024-03-27 PROCEDURE — 3008F BODY MASS INDEX DOCD: CPT | Performed by: OTHER

## 2024-03-27 RX ORDER — RIMEGEPANT SULFATE 75 MG/75MG
75 TABLET, ORALLY DISINTEGRATING ORAL AS NEEDED
Qty: 8 TABLET | Refills: 11 | Status: SHIPPED | OUTPATIENT
Start: 2024-03-27 | End: 2025-03-27

## 2024-03-27 NOTE — PATIENT INSTRUCTIONS
Migraine headache  Introduction  Migraines are extremely painful, recurring headaches that are sometimes accompanied by other symptoms, such as visual disturbances, for example, seeing an aura or nausea. There are 2 types of migraine:  Migraine with aura, formerly called common migraines   Migraine without aura, formerly called classic migraines  If you have a migraine with aura, you may see things, such as stars, or zigzag lines, or have a temporary blind spot about 30 minutes before the headache starts. Even if you do not experience an aura, you may have other warning signs in the period before the headaches starts, such as a craving for sweets, thirst, sleepiness, or depression.  Although there is no cure for migraines, you can manage the condition by reducing the frequency of attacks and reducing pain once an attack starts.  Signs and Symptoms  The headache from a migraine, with or without aura, has the following characteristics:  Throbbing, pounding, or pulsating pain   Often begins on one side of your head and may spread to both or stay on one side   Intense pain is often concentrated around the sides of the forehead   Can last from 4 to 72 hours   These symptoms may happen at the same time or before the headache:  Nausea and vomiting   Dizziness, lightheadedness, or vertigo (feeling like the room is spinning)   Loss of appetite   Fatigue   Visual disturbances, like seeing flashing lights or zigzag lines, temporary blind spots, or blurred vision   Parts of your body may feel numb, weak, or tingly   Light, noise, and movement, especially bending over, worsen head pain. You want to lie down in a dark, quiet room.   Irritability   Symptoms that may linger even after the headache is gone:  Feeling mentally dull like your thinking is not clear or sharp   Sleepiness   Neck pain   Causes  Researchers are not sure what causes a migraine, but they know it involves changes in the blood flow to the brain. At first,  blood vessels narrow or constrict, reducing blood flow and leading to visual disturbances, difficulty speaking, weakness, numbness, or tingling sensation in one area of the body, or other similar symptoms. Later, the blood vessels dilate or enlarge, leading to increased blood flow and a severe headache.  There also seems to be a genetic link to migraine headaches. More than half of people with migraines have an affected family member. Migraine triggers can include the following:  Alcohol, especially beer and red wine   Certain foods, such as aged cheeses, chocolate, nuts, peanut butter, some fruits (like avocado, banana, and citrus), foods with monosodium glutamate (MSG), onions, dairy products, meats containing nitrates (bradley, hot dogs, salami, and cured meats) fermented or pickled foods   Skipping meals   Crying   Fluctuations in hormones, for example during pregnancy, before and during your period, and menopause   Certain odors, such as perfume or smoke   Bright lights   Loud noises   Stress, physical or emotional. The headache often happens when a person is relaxing after a particularly stressful time.   Sleeping too little or too much   Caffeine   Smoking or exposure to tobacco smoke   Some medications   Heat, high humidity, and high altitude   Headache medications. Using headache medications excessively can lead to more frequent and more severe headaches. Called medication overuse headaches, these headaches may complicate every type of headache, including migraine.   Risk Factors  Gender. Women are nearly 3 times more likely to get migraines than men.   Having other family members with migraine headaches   Being under age 40; migraines tend to get better as you age   Taking birth control pills, if your migraines are affected by changes in estrogen levels   Exposure and sensitivity to any of the potential triggers listed above   Diagnosis  Your doctor will take a detailed medical history so he or she can  determine whether you have a migraine or another kind of headache, such as a tension or sinus headache. Your doctor will ask questions about when your headaches occur, how long they last, how often they come on, the location of the pain, and any symptoms that accompany or precede the headaches. Sometimes it helps to keep a diary about your headaches before seeing the doctor, so you'll have an accurate recording of how often they happen. (See Lifestyle section for what information to include in a diary.)  Tests your doctor may order, depending on your symptoms and exam, include:  Computerized tomography (CT) scan, to look for other problems that could be causing your headache   Magnetic resonance imaging (MRI), to look for brain abnormalities, and to look closely at the blood vessels in the brain   Lumbar puncture (spinal tap), if your doctor suspects meningitis or other conditions   Call 911 or go to the emergency room if you have the following symptoms:  You have unusual symptoms you have not experienced before, such as speech problems, change in vision, loss of balance, or difficulty moving a limb   Your headache pattern or intensity is different   You are experiencing \"the worst headache of your life\"   Your headache gets worse when you are lying down   These may indicate a stroke, a bleed in the brain, or other serious condition.   Treatment Approach  Treatment for migraines is aimed at preventing them from happening and reducing pain once an attack starts.  You can control your migraines with a combination of medications, lifestyle changes, and complementary therapies. Biofeedback may help control the initial contraction of blood vessels. Relaxation techniques may reduce both the frequency and intensity of attacks.  Lifestyle  Keeping a migraine diary, particularly when you first begin to have migraines, can help identify the triggers for your headaches so you can avoid them. When a migraine happens, write down  the date and time it started. Note what you ate for the preceding 24 hours, how long you slept the night before, what you were doing just before the headache, any unusual stress in your life, how long the headache lasted, and what you did to make it stop.  Other lifestyle measures that may reduce the number of migraines include:  Avoiding cigarettes, caffeine, and alcohol   Exercising regularly   Getting enough sleep each night   Relaxing and reducing stress in your life (see Mind-Body Medicine section)   Eating regular meals   Once a headache or migraine symptoms begin, it helps to:   Rest in a quiet, darkened room   Drink fluids to avoid dehydration, especially if you have vomited   Medications  Medications for migraines can be classified in two major categories: those designed to prevent attacks, and those designed to relieve pain.  Drugs for Prevention  Your doctor may prescribe medications to prevent migraines if you have 2 or more migraines per month, use pain relievers more than twice a week, or if your symptoms are especially debilitating. Depending on your condition and medication, your doctor may recommend taking the medication daily or when a known trigger is about to happen.  Beta-blockers. Also used to treat heart disease; researchers are not sure why they work for migraines, although they may help keep blood vessels in the brain from constricting and dilating. Beta-blockers include:   Atenolol (Tenormin)   Metoprolol (Lopressor, Toprol-XL)   Propranolol (Inderal, Inderal LA)   Calcium-channel blockers. Another heart disease drug that can help prevent migraines, including:   Verapamil (Calan, Isoptin)   Diltiazem (Cardizem, Dilacor)   Antidepressants. Tricyclic antidepressants are helpful in preventing all kinds of headaches, including migraines. Tricyclic antidepressants include:   Amitriptyline (Elavil)   Nortriptyline (Pamelor)   Doxepin (Sinequan)   Imipramine (Tofranil)   Anticonvulsants. Some  antiseizure drugs help prevent migraines, although researchers are not sure why:   Divalproex sodium (Depakote)   Gabapentin (Neurontin)   Topiramate (Topamax)   Botox. Botox, a medication made from a purified form of botulinum toxin, has been approved to treat migraines. Researchers are not sure why it helps some people. To treat migraines, Botox is given as a series of injections in the forehead, temples, back of the neck, and shoulders. Injections are given about every 3 months.   Drugs for Treatment  To work, these medications should be taken as soon as you feel a migraine coming on.  Triptans. These medications are often the first ones prescribed to relieve pain, nausea, and sensitivity to light and sound. They work by constricting the blood vessels in the brain. Triptans include:   Almotriptan (Axert)   Eletriptan (Relpax)   Frovatriptan (Frova)   Naratriptan (Amerge)   Rizatriptan (Maxalt)   Sumatriptan (Imitrex)   Zolmitriptan (Zomig)   Ergots. Ergots also work by constricting blood vessels, but tend to have more side effects than triptans. Ergots include:   Dihydroergotamine (Migranal)   Ergotamine (Ergomar, Cafergot)   Isometheptene, dichloralphenazone, and acetaminophen (Midrin). Midrin combines a pain reliever (acetaminophen) and sedative (dichloralphenazone) with a medication that constricts blood vessels (isometheptene) to prevent migraines.   Other medications used to treat the headache pain or associated symptoms:  Antinausea drugs   Acetaminophen, aspirin, and caffeine (Excedrin Migraine) is an FDA-approved, over-the-counter treatment for migraine.   Ibuprofen (Advil Migraine, Motrin Migraine) is also an FDA-approved, over-the-counter migraine medication.   Narcotics, such as codeine, are sometimes used for people who can't take triptans or ergots, however, they can cause dependency and rebound headaches.   CGRP Antagnonists. Nurtec and Ubrelvy  Nutrition and Dietary Supplements  Diet  The following  foods may trigger migraine headaches:  Chocolate   Cheese   Monosodium glutamate (MSG), a flavor enhancer found often in food from [employer] restaurants   Foods containing the amino acid tyramine, found in red wine, aged cheese, smoked fish, chicken livers, figs, and some beans   Nuts   Peanut butter   Some fruits, like avocado, banana, and citrus   Onions   Dairy products   Meats containing nitrates, such as bradley, hot dogs, salami, cured meats   Fermented or pickled foods   If you think that any of these foods cause your migraines, try eliminating all the items on this list from your diet and then reintroducing them one at a time. Pay close attention to when the number of headaches increases after eating certain foods. Then you know which trigger foods to avoid. You may also want to consider food allergy testing to determine your specific sensitivities or triggers.  Supplements  5-hydroxytryptophan (5-HTP). Your body makes the amino acid 5-HTP and converts it into serotonin, an important brain chemical. Researchers think abnormal serotonin function in blood vessels may be related to migraines, and some of the drugs used to treat migraines work by affecting serotonin. Several studies indicate that 5-HTP may be as effective as some prescription migraine medications at reducing the intensity and frequency of attacks. But not all studies agree. One study found that 5-HTP was less effective than the beta-blocker Inderal. More studies are needed to be sure that 5-HTP is helpful in treating migraines. If you have a history of psychiatric illness, take an antidepressant, or supplements such as [place]'s wort or SAMe, you should not take 5-HTP except under your doctor's supervision. If you are pregnant or breastfeeding, do not take 5-HTP without first asking your doctor.   Magnesium 400 mg/day. People with migraines often have lower levels of magnesium than people who do not have migraines, and several studies suggest that  magnesium may reduce the frequency of migraine attacks in people with low levels of magnesium. In one study, people who took magnesium reduce the frequency of attacks by 41.6%, compared to 15.8% in those who took placebo. Some studies also suggest that magnesium may help women whose migraines are triggered by their periods. Side effects from magnesium can include lower blood pressure and diarrhea. Magnesium can interact with medications, including heart medications, diuretics or water pills, some antibiotics, and muscle relaxers.   Vitamin B2 (riboflavin) 400 mg/day. A few studies indicate that riboflavin may reduce the frequency and duration of migraines. In one study, people who took riboflavin had more than a 50% decrease in the number of attacks. Not all studies have found riboflavin to be effective, however. More research is needed. Riboflavin can interact with some medications, including tricyclic antidepressants, medications called anticholinergic drugs that are used to treat a variety of conditions, the antiseizure drug phenobarbital, and probenecid, used to treat gout.   Preliminary research indicates that these supplements may also help prevent migraines, although more research is needed to say for sure:  Coenzyme Q10 (CoQ10). CoQ10 can interact with several medications including blood thinners, such as warfarin (Coumadin), some cancer medications, and medications for high blood pressure.   Melatonin. Melatonin can interact with a number of medications, so ask your doctor before taking it.     Acupuncture  Acupuncture has been studied as a treatment for migraine headache for more than 20 years. While not all studies have shown it helps, researchers agree that acupuncture appears safe, and may work for some people. A study published in 2003 suggest that getting an acupuncture treatment when migraine symptoms first start works as well as taking the drug Imitrex. As symptoms continue, however, the medication  works better than acupuncture.  In addition to needling treatment, acupuncturists may recommend lifestyle changes, such as suggestions for specific breathing techniques, qi gong exercise, and dietary changes.  Mind-Body Medicine  Reducing and learning to cope with stress may help reduce the number and intensity of your headaches. Techniques that can help include:  Self-hypnosis   Biofeedback   Joining a support group   Relaxation techniques, such as progressive muscle relaxation (alternately melita and releasing muscles throughout your body), meditation, and guided imagery   Psychotherapy   Other Considerations  Pregnancy  Many of the medications, herbs, and supplements used to prevent or treat migraines should not be used during pregnancy. Talk to your doctor before using any medication, over-the-counter or prescription, or any complementary therapy before or during your pregnancy. Some doctors may recommend treating mild-to-moderate attacks during pregnancy with acetaminophen (Tylenol).  Warnings and Precautions  Use medications only as directed. Using some medications on a regular basis can cause rebound headaches.  Call your doctor if you experience a new headache, a change in quality of a previous headache or headache pattern, or if a medication that usually takes away the pain no longer works.  Prognosis and Complications  Migraine headaches generally do not pose a threat to your overall health, although they can be chronic, recurrent, frustrating, and interfere with your daily life. Stroke is an extremely rare complication from severe migraines. Other studies show that migraine headaches are associated with heart disease. People who have migraines are up to 4 times more likely to suffer from depression. Migraine patients also have an increased rate of anxiety and panic disorders.  Adolescent migraine is associated with inflammatory conditions, such as asthma and seasonal allergies, as well as with  epilepsy, persistent nightmares, and motion sickness. Many people find that migraines go into remission, meaning they stop for a long time and happen only very infrequently, or even disappear altogether, especially as you get older. For women, this may be related to lower levels of estrogen after menopause.     If your migraine attacks are becoming increasingly more frequent, if you are suffering attacks of disabling migraine despite optimal use of acute therapies, if you are experiencing some degree of headache more days than not, or if your migraine has evolved to the point where you are experiencing headache on a daily or near-daily basis, then it is time to consider prophylactic therapy to reduce attack frequency or, if your migraine has “chronified” and become daily or near-daily, suppression therapy to help you achieve remission back to episodic migraine.    As with acute migraine treatment, migraine prevention/suppression therapy may involve nonpharmacologic measures as well as medications; examples of the former include an aerobic conditioning program, avoidance of migraine attack “triggers” and striving to maintain good sleep hygiene. While acute migraine may respond to nonpharmacologic interventions alone, chronic migraine typically requires effective use of both medications intended for acute migraine treatment and medications for prevention/suppression. Before a specific medication for prevention/ suppression is selected, there are some important treatment issues to keep in mind:     Take an adequate dose of the medication for an adequate duration    Many patients come to clinic and report they have failed “everything” they have tried for migraine prevention/suppression. When asked about specific drugs, however, it often turns out the patient took the given medication only at a very low dose and for only a short period of time, discontinuing therapy because of side effects, lack of improvement or both.  The orally administered prevention/ suppression therapies known to be effective for migraine prevention/suppression may require a substantial period of treatment at a therapeutic dose to begin to exert any clinically detectable beneficial effect, and in some cases - so as to minimize drug-related side effects - it is necessary to begin treatment at a low dose and subsequently increase to a target dose over a period of weeks. This can be an especially tough time for a patient who is experiencing headache on a daily or near-daily basis. There is no current therapy for migraine prevention/suppression that has better than a coin flip’s (50%) chance of promoting significant improvement, and it is difficult for your physician to predict up-front whether the treatment prescribed will be effective. Thus, pharmacologic treatment intended for migraine prevention/suppression is a matter of educated trial and error, a process which can prove to be frustrating for patients and physicians alike.     Don’t stick with a loser    If you have been taking an adequate dose of a prevention/suppression medication for an adequate period of time and have not experienced a significant improvement in your headache disorder, then it is either the wrong dose or the wrong drug for you. It is time for a change. Call your doctor.     Treat “break-through” headache attacks aggressively    Aggressive treatment of acute migraine attacks or intensifications will assist in reducing the likelihood of subsequent attacks and in achieving remission of chronic migraine back to its episodic form. Headache tends to beget headache, and if you are attempting to reduce your attack frequency or reverse the headache chronification process, experiencing prolonged attacks of severe migraine will work against that effort. And remember: treat early!     Avoid overuse of symptomatic (acute) medication    There is solid scientific evidence to suggest that overuse of  symptomatic medication reinforces chronification and that ceasing overuse may be accompanied by clinical improvement. Remember: restrict your use of the simple analgesics to no more than 15 days per month and your use of virtually all prescription medications intended for acute migraine treatment to no more than 10 days per month. Of all the medications available for acute migraine treatment, the nonsteroidal anti-inflammatory drugs appear to have the lowest potential for promoting medication overuse headache.     Chronic, indefinite use of a medication for prevention/suppression of migraine rarely is required    Many patients will not require chronic treatment with a prevention/suppression therapy for periods exceeding 6 months to a year. The goal of prevention/therapy is “headache-free or nearly so”… again at the cost of no or minimal side effects. Once you have reached that point and remained there for 4 to 6 months, it is quite possible that you can taper off your prevention/ suppression medication and that you will not require such therapy again for months to years.    “To Do” checklist for improving your headache control  1. Initiate (and stick to) an aerobic conditioning program.    2. Keep a careful headache diary.    3. Maintain good sleep hygiene.    4. Eat nutritional, regularly spaced meals.    5. Treat acute attacks of migraine early and aggressively.    6. Don’t overuse symptomatic medication.    7. If you require prevention/suppression therapy, give the specific therapy a decent chance.    8. If you suffer from a migraine “co-morbidity,” such as chronic insomnia, anxiety, depression, or other medical problems, seek treatment for that condition.    9. Regularly set aside time for personal relaxation and stress reduction.    10. Give back . . . to your family, friends, community. No matter how badly your migraine makes you feel, you will deal with your headache disorder more effectively if you are  actively engaged in the world around you.

## 2024-03-27 NOTE — PROGRESS NOTES
Neurology follow-up visit     Referred By: Dr. Self ref. provider found    Chief Complaint:   Chief Complaint   Patient presents with    Migraine     LOV05/08/2023 Patient present with migraines. Patient has completed her MRI and MRA and would like to review the results. Patient also states that she still has VA problems and its accompanied by dizziness. Patient states she has problems with focusing and sometimes she has headache.       HPI:     Cate Clayton is a 64 year old female, who presents for visual disturbances.  Apparently in 2021 and then 2023 patient had few episodes of vision disturbances.  Most recently in April 2023 it was described as seeing herself driving from above, followed by dizziness and pain in the back of the head on the left side.  She went to the emergency room.  CT of the head was normal, but was not revealing.  Possibility of migraines was raised.  Especially with her prior history in 2021 of similar visual disturbance that was followed by sharp pain.  She also felt some dizzy spell after that for about 2 weeks consistently.  By the time she came to see me in May 2023 she was back to usual self.  She denies any other focal logical symptoms.    MRA of the head was done, that was not revealing, specifically no aneurysms.  MRI of the brain itself was also normal.    Patient continued to have his dizzy spells, associated with wavy sensation in the vision.  Very rarely followed by headache.  In March 2024 she was reporting these episodes happening for a number of years.  At least down.    Past Medical History:   Diagnosis Date    Sleep apnea        No past surgical history on file.    Social history:  History   Smoking Status    Former    Packs/day: 1.00    Years: 25.00    Types: Cigarettes    Quit date: 5/2/2005   Smokeless Tobacco    Never     History   Alcohol Use    0.0 standard drinks of alcohol/week     Comment: Socially     History   Drug Use No       Family History   Problem  Relation Age of Onset    Cancer Father     Hypertension Father          Current Outpatient Medications:     estradiol 0.1 MG/GM Vaginal Cream, APPLY 1-2 GRAM TO AFFECTED AREA AS NEEDED, Disp: , Rfl:     fluticasone propionate 50 MCG/ACT Nasal Suspension, , Disp: , Rfl:     predniSONE 20 MG Oral Tab, Take 1 tablet (20 mg total) by mouth daily. (Patient not taking: Reported on 2/26/2024), Disp: 3 tablet, Rfl: 0    azithromycin (ZITHROMAX Z-ALINE) 250 MG Oral Tab, Take 1 by oral route every day for 10 days. 2 tablets today. (Patient not taking: Reported on 2/26/2024), Disp: 11 tablet, Rfl: 0    carbamide peroxide 6.5 % Otic Solution, Place 5 drops into the right ear as needed., Disp: 15 mL, Rfl: 0    benzonatate 100 MG Oral Cap, Take 1 capsule (100 mg total) by mouth 3 (three) times daily as needed for cough (May cause drowsiness no driving or operating machinery while taking this medication). (Patient not taking: Reported on 7/28/2023), Disp: 12 capsule, Rfl: 0    diphenhydrAMINE (BENADRYL ALLERGY) 25 MG Oral Cap, Take 1 capsule (25 mg total) by mouth every 6 (six) hours as needed., Disp: 20 capsule, Rfl: 0    Vitamin B-12 500 MCG Oral Tab, Take 1 tablet (500 mcg total) by mouth daily., Disp: , Rfl:     Saline (AYR NASAL MIST ALLERGY/SINUS) 2.65 % Nasal Solution, 1 spray by Nasal route 3 (three) times daily., Disp: 50 mL, Rfl: 0    tretinoin 0.025 % External Cream, Apply a pea-sized amount to the entire face every other night for 2 wks then every night thereafter, Disp: 30 g, Rfl: 4    Multiple Vitamins-Minerals (MULTI VITAMIN/MINERALS) Oral Tab, Take by mouth., Disp: , Rfl:     ALLEGRA 180 MG OR TABS, 1 TABLET DAILY, Disp: , Rfl:     Allergies   Allergen Reactions    Mupirocin ITCHING and RASH    Bacitracin RASH    Latex RASH       ROS:   As in HPI, the rest of the 14 system review was done and was negative      Physical Exam:  Vitals:    03/27/24 0958   Weight: 173 lb (78.5 kg)   Height: 66\"       General: No apparent  distress, well nourished, well groomed.  Head- Normocephalic, atraumatic  Eyes- No redness or swelling  ENT- Hearing intake, normal glutition  Neck- No masses or adenopathy  Cv: pulses were palpable and normal, no cyanosis or edema     Neurological:     Mental Status- Alert and oriented x3.  Normal attention span and concentration  Thought process intact  Memory intact- recent and remote  Mood intact  Fund of knowledge appropriate for education and age    Language intact including: comprehension, naming, repetition, vocabulary    Cranial Nerves:    VII. Face symmetric, no facial weakness  VIII. Hearing intact to whisper.  XII. Tongue is midline    Motor Exam:  Muscle tone normal  No atrophy or fasciculations  Strength- upper extremities 5/5 proximally and distally                    Gait:  Normal posture  Normal physiologic      Labs:    No results found for: \"TSH\"  Lab Results   Component Value Date    HDL 59 03/05/2024     (H) 03/05/2024    TRIG 68 03/05/2024     Lab Results   Component Value Date    HGB 14.1 04/13/2023    HCT 42.8 04/13/2023    MCV 93.2 04/13/2023    WBC 4.7 04/13/2023    .0 04/13/2023      Lab Results   Component Value Date    BUN 11 04/13/2023    CA 9.1 04/13/2023    ALT 33 04/13/2023    AST 21 04/13/2023    ALB 4.0 04/13/2023     04/13/2023    K 3.8 04/13/2023     04/13/2023    CO2 27.0 04/13/2023      I have reviewed labs.    Imaging Studies:  I have independently reviewed imaging.  CT of the head was not revealing.    MRI of the brain was independent reviewed, no obvious significant abnormalities.    Assessment   1. Migraine with aura and without status migrainosus, not intractable  While most likely these are migraine auras, she does not describe many other headaches in the past.  Therefore we did MRI of the brain and MRA of the head and that was not revealing.  At this point we will try Nurtec as rescue therapy.  Will try to stay away from triptans due to her age  and the risks of coronary artery disease    2. Transient neurological symptoms    3. Vertigo    - PHYSICAL THERAPY - INTERNAL           Education and counseling provided to patient. Instructed patient to call my office or seek medical attention immediately if symptoms worsen.  Patient verbalized understanding of information given. All questions were answered. All side effects of drugs were discussed.       Return to clinic in: No follow-ups on file.    Manuel Uribe MD

## 2024-03-30 ENCOUNTER — HOSPITAL ENCOUNTER (OUTPATIENT)
Age: 64
Discharge: HOME | End: 2024-03-30
Payer: COMMERCIAL

## 2024-03-30 VITALS
RESPIRATION RATE: 18 BRPM | TEMPERATURE: 99 F | HEART RATE: 89 BPM | SYSTOLIC BLOOD PRESSURE: 131 MMHG | OXYGEN SATURATION: 97 % | DIASTOLIC BLOOD PRESSURE: 84 MMHG

## 2024-03-30 DIAGNOSIS — J01.90 ACUTE NON-RECURRENT SINUSITIS, UNSPECIFIED LOCATION: ICD-10-CM

## 2024-03-30 DIAGNOSIS — H66.91 RIGHT ACUTE OTITIS MEDIA: Primary | ICD-10-CM

## 2024-03-30 DIAGNOSIS — R05.9 COUGH: ICD-10-CM

## 2024-03-30 LAB
S PYO AG THROAT QL: NEGATIVE
SARS-COV-2 RNA RESP QL NAA+PROBE: NOT DETECTED

## 2024-03-30 PROCEDURE — 87880 STREP A ASSAY W/OPTIC: CPT | Performed by: PHYSICIAN ASSISTANT

## 2024-03-30 PROCEDURE — 99213 OFFICE O/P EST LOW 20 MIN: CPT | Performed by: PHYSICIAN ASSISTANT

## 2024-03-30 PROCEDURE — U0002 COVID-19 LAB TEST NON-CDC: HCPCS | Performed by: PHYSICIAN ASSISTANT

## 2024-03-30 RX ORDER — ALBUTEROL SULFATE 90 UG/1
2 AEROSOL, METERED RESPIRATORY (INHALATION) EVERY 4 HOURS PRN
Qty: 1 EACH | Refills: 0 | Status: SHIPPED | OUTPATIENT
Start: 2024-03-30 | End: 2024-04-29

## 2024-03-30 RX ORDER — CODEINE PHOSPHATE AND GUAIFENESIN 10; 100 MG/5ML; MG/5ML
5 SOLUTION ORAL EVERY 6 HOURS PRN
Qty: 50 ML | Refills: 0 | Status: SHIPPED | OUTPATIENT
Start: 2024-03-30

## 2024-03-30 RX ORDER — BENZONATATE 100 MG/1
100 CAPSULE ORAL 3 TIMES DAILY PRN
Qty: 30 CAPSULE | Refills: 0 | Status: SHIPPED | OUTPATIENT
Start: 2024-03-30 | End: 2024-04-29

## 2024-03-30 RX ORDER — CEFDINIR 300 MG/1
300 CAPSULE ORAL 2 TIMES DAILY
Qty: 20 CAPSULE | Refills: 0 | Status: SHIPPED | OUTPATIENT
Start: 2024-03-30 | End: 2024-04-09

## 2024-03-30 NOTE — ED PROVIDER NOTES
Patient Seen in: [facility]    History     Chief Complaint   Patient presents with    Cough/URI     Stated Complaint: Sinus Pain, Cough    [facility]    Cate Clayton is a 64 year old female presents with chief complaint of cough.  Onset 6 days ago.  Patient reports associated sore throat, nasal congestion, sinus pain and right earache.  Patient denies fever, chills, otorrhea, trismus, drooling, abdominal pain, nausea, vomiting, diarrhea, constipation, dysuria, hematuria, flank pain, rash, neck pain, neck swelling, restricted neck movement, dyspnea, wheeze, hemoptysis.      Past Medical History:   Diagnosis Date    Sleep apnea        History reviewed. No pertinent surgical history.         Family History   Problem Relation Age of Onset    Cancer Father     Hypertension Father        Social History     Socioeconomic History    Marital status:    Tobacco Use    Smoking status: Former     Packs/day: 1.00     Years: 25.00     Additional pack years: 0.00     Total pack years: 25.00     Types: Cigarettes     Quit date: 2005     Years since quittin.9    Smokeless tobacco: Never   Substance and Sexual Activity    Alcohol use: Yes     Alcohol/week: 0.0 standard drinks of alcohol     Comment: Socially    Drug use: No       Review of Systems    Positive for stated complaint: Sinus Pain, Cough  Other systems are as noted in HPI.  Constitutional and vital signs reviewed.      All other systems reviewed and negative except as noted above.    PSFH elements reviewed from today and agreed except as otherwise stated in HPI.    Physical Exam     ED Triage Vitals [24 0848]   /84   Pulse 89   Resp 18   Temp 98.8 °F (37.1 °C)   Temp src Temporal   SpO2 97 %   O2 Device None (Room air)       Current:/84   Pulse 89   Temp 98.8 °F (37.1 °C) (Temporal)   Resp 18   SpO2 97%     PULSE OX within normal limits on room air as interpreted by this provider.     Constitutional: The patient is  cooperative. Appears well-developed and well-nourished.  Mild discomfort.  Psychological: Alert, No abnormalities of mood, affect.  Head: Normocephalic/atraumatic.    Eyes: Pupils are equal round reactive to light.  Conjunctiva are within normal limits.  ENT: Pharynx injected.  Tonsils within normal size limits bilaterally.  No tonsillar exudates.  Uvula midline.  No trismus.  No drooling.  Right TM injected, bulging.  Purulent fluid present proximally to intact right TM.  Left TM within normal limits.  Auditory canals within normal limits bilaterally.  No post auricular tenderness, adenopathy or erythema.  No otorrhea mucous membranes moist.  Neck: The neck is supple.  Nontender.  No meningeal signs.  Chest: There is no tenderness to the chest wall.  No CVA tenderness bilaterally.  Respiratory: Respiratory effort was normal.  There is no rales, wheezes, or rhonchi.  There is no stridor.  Air entry is equal.  Cardiovascular: Regular rate and rhythm.  Capillary refill is brisk.    Genitourinary: Not examined.  Lymphatic: No gross lymphadenopathy noted.  Musculoskeletal: Musculoskeletal system is grossly intact.  There is no obvious deformity.  Neurological: No facial asymmetry.  Normal gait.  Normal sensory exam.  Patient exhibits normal speech.  Strength and range of motion symmetrical of all extremities x4.  Skin: Skin is normal to inspection.  Warm and dry.  No obvious bruising.  No obvious rash.        ED Course     Labs Reviewed   POCT RAPID STREP - Normal   RAPID SARS-COV-2 BY PCR - Normal       MDM     Patient declined chest x-rays.    Physical exam remained stable over serial reexaminations as previously documented.  Results reviewed with patient.    I have given the patient instructions regarding their diagnoses, expectations, follow up, and ER precautions. I explained to the patient that emergent conditions may arise and to go to the ER for new, worsening or any persistent conditions. I've explained the  importance of following up with their doctor as instructed. The patient verbalized understanding of the discharge instructions and plan.        Disposition and Plan     Clinical Impression:  1. Right acute otitis media    2. Cough    3. Acute non-recurrent sinusitis, unspecified location        Disposition:  Discharge    Follow-up:  Jose Bell MD  [address] [facility] [address]  [facility] [address]  [phone]    Call in 1 day  For follow-up      Medications Prescribed:  Current Discharge Medication List        START taking these medications    Details   cefdinir 300 MG Oral Cap Take 1 capsule (300 mg total) by mouth 2 (two) times daily for 10 days.  Qty: 20 capsule, Refills: 0      Spacer/Aero-Holding Chambers Does not apply Device Use with albuterol inhaler  Qty: 1 each, Refills: 0      albuterol 108 (90 Base) MCG/ACT Inhalation Aero Soln Inhale 2 puffs into the lungs every 4 (four) hours as needed for Wheezing.  Qty: 1 each, Refills: 0      !! benzonatate 100 MG Oral Cap Take 1 capsule (100 mg total) by mouth 3 (three) times daily as needed for cough.  Qty: 30 capsule, Refills: 0       !! - Potential duplicate medications found. Please discuss with provider.

## 2024-04-19 ENCOUNTER — HOSPITAL ENCOUNTER (OUTPATIENT)
Age: 64
Discharge: HOME | End: 2024-04-19
Payer: COMMERCIAL

## 2024-04-19 ENCOUNTER — APPOINTMENT (OUTPATIENT)
Dept: GENERAL RADIOLOGY | Age: 64
End: 2024-04-19
Payer: COMMERCIAL

## 2024-04-19 VITALS
OXYGEN SATURATION: 98 % | WEIGHT: 173 LBS | TEMPERATURE: 98 F | HEART RATE: 69 BPM | BODY MASS INDEX: 27.8 KG/M2 | SYSTOLIC BLOOD PRESSURE: 156 MMHG | DIASTOLIC BLOOD PRESSURE: 87 MMHG | HEIGHT: 66 IN | RESPIRATION RATE: 18 BRPM

## 2024-04-19 DIAGNOSIS — S90.31XA CONTUSION OF RIGHT FOOT, INITIAL ENCOUNTER: ICD-10-CM

## 2024-04-19 DIAGNOSIS — R22.41 LOCALIZED SWELLING OF RIGHT FOOT: Primary | ICD-10-CM

## 2024-04-19 PROCEDURE — 73630 X-RAY EXAM OF FOOT: CPT

## 2024-04-19 PROCEDURE — L3260 AMBULATORY SURGICAL BOOT EAC: HCPCS

## 2024-04-19 PROCEDURE — 99213 OFFICE O/P EST LOW 20 MIN: CPT

## 2024-04-19 NOTE — ED INITIAL ASSESSMENT (HPI)
Pt presents to the IC with c/o right foot pain, swelling and bruising to the top of the foot. Denies injury. Mild discomfort. Pain with touch.

## 2024-04-19 NOTE — DISCHARGE INSTRUCTIONS
There is no fracture on your x-ray.  You have a contusion of your foot.  Wear the post-op shoe for comfort.  Rest, ice and elevate.  Take Tylenol and Motrin for pain as needed.  If you develop any numbness, tingling, acutely worsening pain, swelling or any other concerning complaints you should go to the emergency department.  If you have persistent pain for more than the next week make an appointment to see the podiatry specialist.  Otherwise follow-up with your primary care doctor.

## 2024-04-19 NOTE — ED PROVIDER NOTES
Patient Seen in: [facility]      History     Chief Complaint   Patient presents with    Foot Pain     Stated Complaint: Right foot pain    Subjective:   Cate is a 64-year-old female presenting to the immediate care complaining of a bruise and swelling to her right foot.  Patient states that she was out last night for dinner and was sitting in a higher barstool.  States that when she got home she noticed some bruising and swelling to the top of her right foot.  She denies any known injury or trauma.  States that she does not remember hitting her foot or dropping anything on it.  Patient states that she has pain to the top of her foot only.  No pain to her ankle or any other area of the foot.  Denies any numbness or tingling to her toes.  There is no bleeding or open wounds.  Patient states that the swelling improved this morning however she still has pain so she came in for evaluation.  She denies any other concerns or complaints.          Objective:   Past Medical History:    Sleep apnea              History reviewed. No pertinent surgical history.             Social History     Socioeconomic History    Marital status:    Tobacco Use    Smoking status: Former     Current packs/day: 0.00     Average packs/day: 1 pack/day for 25.0 years (25.0 ttl pk-yrs)     Types: Cigarettes     Start date: 1980     Quit date: 2005     Years since quittin.9    Smokeless tobacco: Never   Substance and Sexual Activity    Alcohol use: Yes     Alcohol/week: 0.0 standard drinks of alcohol     Comment: Socially    Drug use: No              Review of Systems    Positive for stated complaint: Right foot pain  Other systems are as noted in HPI.  Constitutional and vital signs reviewed.      All other systems reviewed and negative except as noted above.    Physical Exam     ED Triage Vitals   BP 24 0831 156/87   Pulse 24 0830 69   Resp 24 0830 18   Temp 24 0830 98.1 °F (36.7 °C)    Temp src 04/19/24 0830 Temporal   SpO2 04/19/24 0830 98 %   O2 Device 04/19/24 0830 None (Room air)       Current:/87   Pulse 69   Temp 98.1 °F (36.7 °C) (Temporal)   Resp 18   Ht 167.6 cm (5' 6\")   Wt 78.5 kg   SpO2 98%   BMI 27.92 kg/m²         Physical Exam  Vitals and nursing note reviewed.   Constitutional:       General: She is not in acute distress.     Appearance: Normal appearance. She is not ill-appearing, toxic-appearing or diaphoretic.   HENT:      Head: Normocephalic.   Cardiovascular:      Rate and Rhythm: Normal rate.   Pulmonary:      Effort: Pulmonary effort is normal.   Musculoskeletal:         General: Normal range of motion.      Cervical back: Normal range of motion.      Right ankle: Normal.      Left ankle: Normal.      Right foot: Normal range of motion and normal capillary refill. Swelling, tenderness and bony tenderness present. No deformity, bunion, Charcot foot, foot drop, prominent metatarsal heads, laceration or crepitus. Normal pulse.      Left foot: Normal.      Comments: Positive CMS.  2+ DP and PT pulses. Capillary refill is less than 2 seconds. Patient does have full range of motion to the entire right foot and all 5 toes.  The right ankle, calf, knee, and thigh and hip are unremarkable.  There is no erythema or warmth noted.  Ecchymosis noted to the mid foot over the 2nd, 3rd and 4th metatarsals.  No abrasions or lacerations noted.  No obvious deformity is noted.  No bleeding noted.  Patient does have tenderness with palpation to the right dorsal mid-foot.  Plantar foot is unremarkable.     Skin:     General: Skin is warm and dry.      Capillary Refill: Capillary refill takes less than 2 seconds.   Neurological:      General: No focal deficit present.      Mental Status: She is alert and oriented to person, place, and time.   Psychiatric:         Mood and Affect: Mood normal.         Behavior: Behavior normal.         Thought Content: Thought content normal.          Judgment: Judgment normal.              ED Course   Labs Reviewed - No data to display  XR FOOT, COMPLETE (MIN 3 VIEWS), RIGHT (CPT=73630)    Result Date: 4/19/2024  CONCLUSION:   No acute fracture or dislocation.  Mild soft tissue swelling about the dorsal aspect of the foot.  Plantar calcaneal enthesophyte.     Dictated by (CST): Jose F Wei MD on 4/19/2024 at 9:12 AM     Finalized by (CST): Jose F Wei MD on 4/19/2024 at 9:14 AM                [facility]          Medical Decision Making  Multiple medical diagnoses were considered including but not limited to contusion, sprain, fracture, less likely cellulitis or DVT.  Patient is well appearing, non-toxic and in no acute distress.  Vital signs are stable.   There is no fracture on x-ray per the radiology read.  I independently reviewed the films, there are no obvious signs of fracture.  Patient's history and physical exam are consistent with a sprain.  There is no redness, warmth or wounds.  There is no calf pain, tenderness, swelling or redness.  Stop shoe placed for comfort.  Recommended RICE.  Recommended using Tylenol and Motrin for pain.  Recommended that if the patient develop any numbness, tingling, acutely worsening pain, swelling or any other concerns or complaints they go to the emergency department.  Recommended that if patient has persistent pain they make an appointment to follow-up with the podiatry specialist.  Otherwise recommended follow-up with primary care doctor.  ED precautions discussed.  Patient advised to follow up with PCP in 2-3 days.  Patient agrees with this plan of care.  Patient verbalizes understanding of discharge instructions and plan of care.      Amount and/or Complexity of Data Reviewed  Radiology: ordered and independent interpretation performed. Decision-making details documented in ED Course.        Disposition and Plan     Clinical Impression:  1. Localized swelling of right foot    2. Contusion of right foot, initial  encounter         Disposition:  Discharge  4/19/2024  9:34 am    Follow-up:  Jose Bell MD  [address] [place] [address] [address]  [facility] [address]  [phone]          Felipa Morrison, DPDOE  [address] [address]  [phone]                Medications Prescribed:  Discharge Medication List as of 4/19/2024  9:34 AM

## 2024-04-30 ENCOUNTER — OFFICE VISIT (OUTPATIENT)
Dept: PODIATRY CLINIC | Facility: CLINIC | Age: 64
End: 2024-04-30

## 2024-04-30 DIAGNOSIS — S93.601A SPRAIN OF RIGHT FOOT, INITIAL ENCOUNTER: Primary | ICD-10-CM

## 2024-04-30 PROCEDURE — 99213 OFFICE O/P EST LOW 20 MIN: CPT | Performed by: STUDENT IN AN ORGANIZED HEALTH CARE EDUCATION/TRAINING PROGRAM

## 2024-04-30 RX ORDER — METHYLPREDNISOLONE 4 MG/1
TABLET ORAL
Qty: 21 TABLET | Refills: 0 | Status: SHIPPED | OUTPATIENT
Start: 2024-04-30

## 2024-04-30 NOTE — PROGRESS NOTES
[facility] Podiatry  Progress Note      Cate Clayton is a 64 year old female.   Chief Complaint   Patient presents with    Consult     Right foot swelling- patient states on 4/18/24  she wore some velcro sandals and later that day she had swelling on the top of the foot. She denies any injury. Patient was at urgent care on 4/19/24 and had xray . Patient wearing PO shoe. Patient has pain 3/10 at the end of the day and tender to touch.              HPI:     Patient is a pleasant 64-year-old female presents to clinic today for evaluation of right foot swelling and bruising which occurred on April 18th when she wore velcro sandals.  Patient admits that she was out with her  and was wearing wedges with Velcro and later that night when she came home she noticed that the right midfoot was swollen and bruised.  The swelling to the right midfoot has dissipated however there is bruising to the right lesser digits.  Admits to 3 out of 10 pain at the end of the day and slightly tender to touch at the right midfoot    Allergies: Mupirocin, Bacitracin, and Latex    Current Outpatient Medications   Medication Sig Dispense Refill    methylPREDNISolone 4 MG Oral Tablet Therapy Pack Take per package insert (instructions). Take as directed on the box 21 tablet 0    Spacer/Aero-Holding Chambers Does not apply Device Use with albuterol inhaler 1 each 0    Rimegepant Sulfate (NURTEC) 75 MG Oral Tablet Dispersible Take 75 mg by mouth as needed. Take one tablet at onset of migraine.  Maximum dose in 24 hours is 1 tablet (75mg). 8 tablet 11    estradiol 0.1 MG/GM Vaginal Cream APPLY 1-2 GRAM TO AFFECTED AREA AS NEEDED      fluticasone propionate 50 MCG/ACT Nasal Suspension       carbamide peroxide 6.5 % Otic Solution Place 5 drops into the right ear as needed. 15 mL 0    Vitamin B-12 500 MCG Oral Tab Take 1 tablet (500 mcg total) by mouth daily.      Saline (AYR NASAL MIST ALLERGY/SINUS) 2.65 % Nasal Solution 1 spray  by Nasal route 3 (three) times daily. 50 mL 0    Multiple Vitamins-Minerals (MULTI VITAMIN/MINERALS) Oral Tab Take by mouth.      ALLEGRA 180 MG OR TABS 1 TABLET DAILY      tretinoin 0.025 % External Cream Apply a pea-sized amount to the entire face every other night for 2 wks then every night thereafter (Patient not taking: Reported on 2024) 30 g 4      Past Medical History:    Sleep apnea      History reviewed. No pertinent surgical history.   Family History   Problem Relation Age of Onset    Cancer Father     Hypertension Father       Social History     Socioeconomic History    Marital status:    Tobacco Use    Smoking status: Former     Current packs/day: 0.00     Average packs/day: 1 pack/day for 25.0 years (25.0 ttl pk-yrs)     Types: Cigarettes     Start date: 1980     Quit date: 2005     Years since quittin.0    Smokeless tobacco: Never   Substance and Sexual Activity    Alcohol use: Yes     Alcohol/week: 0.0 standard drinks of alcohol     Comment: Socially    Drug use: No           REVIEW OF SYSTEMS:     Denies nause, fever, chills  No calf pain  Denies chest pain or SOB      EXAM:   There were no vitals taken for this visit.  GENERAL: well developed, well nourished, in no apparent distress  EXTREMITIES:   1. Integument: Normal skin temperature and turgor  2. Vascular: Dorsalis pedis two out of four bilateral and posterior tibial pulses two out of   four bilateral, capillary refill normal.  Ecchymosis to the right lesser digits   3. Musculoskeletal: All muscle groups are graded 5 out of 5 in the foot and ankle.  Tenderness palpation to right midfoot.   4. Neurological: Normal sharp dull sensation; reflexes normal.      XR FOOT, COMPLETE (MIN 3 VIEWS), RIGHT (CPT=73630)    Result Date: 2024  CONCLUSION:   No acute fracture or dislocation.  Mild soft tissue swelling about the dorsal aspect of the foot.  Plantar calcaneal enthesophyte.     Dictated by (CST): Jose F Wei MD on  4/19/2024 at 9:12 AM     Finalized by (CST): Jose F Wei MD on 4/19/2024 at 9:14 AM            ASSESSMENT AND PLAN:   Diagnoses and all orders for this visit:    Sprain of right foot, initial encounter    Other orders  -     methylPREDNISolone 4 MG Oral Tablet Therapy Pack; Take per package insert (instructions). Take as directed on the box        Plan:     Patient seen and examined and findings discussed with patient.  Discussed possible etiology of condition along with treatment options.  Advised patient to remain ambulating in the surgical shoe.  Rest and use warm compress to right foot.  Take Medrol Dosepak as instructed.  Return to clinic for reevaluation.    The patient indicates understanding of these issues and agrees to the plan.        Yael Yip DPM

## 2024-05-30 ENCOUNTER — OFFICE VISIT (OUTPATIENT)
Dept: PODIATRY CLINIC | Facility: CLINIC | Age: 64
End: 2024-05-30

## 2024-05-30 DIAGNOSIS — S93.601D SPRAIN OF RIGHT FOOT, SUBSEQUENT ENCOUNTER: Primary | ICD-10-CM

## 2024-05-30 PROCEDURE — 99213 OFFICE O/P EST LOW 20 MIN: CPT | Performed by: STUDENT IN AN ORGANIZED HEALTH CARE EDUCATION/TRAINING PROGRAM

## 2024-05-30 NOTE — PROGRESS NOTES
[facility]  Progress Note      Cate Clayton is a 64 year old female.   Chief Complaint   Patient presents with    Sprain     Right foot f/u - states she is much better - still has a lump on the foot - no pain              HPI:     Patient is a pleasant 64-year-old female presents to clinic for follow-up of right foot sprain.  Admits that her pain has significantly gotten better.    Allergies: Mupirocin, Bacitracin, and Latex    Current Outpatient Medications   Medication Sig Dispense Refill    methylPREDNISolone 4 MG Oral Tablet Therapy Pack Take per package insert (instructions). Take as directed on the box 21 tablet 0    Spacer/Aero-Holding Chambers Does not apply Device Use with albuterol inhaler 1 each 0    Rimegepant Sulfate (NURTEC) 75 MG Oral Tablet Dispersible Take 75 mg by mouth as needed. Take one tablet at onset of migraine.  Maximum dose in 24 hours is 1 tablet (75mg). 8 tablet 11    estradiol 0.1 MG/GM Vaginal Cream APPLY 1-2 GRAM TO AFFECTED AREA AS NEEDED      fluticasone propionate 50 MCG/ACT Nasal Suspension       carbamide peroxide 6.5 % Otic Solution Place 5 drops into the right ear as needed. 15 mL 0    Vitamin B-12 500 MCG Oral Tab Take 1 tablet (500 mcg total) by mouth daily.      Saline (AYR NASAL MIST ALLERGY/SINUS) 2.65 % Nasal Solution 1 spray by Nasal route 3 (three) times daily. 50 mL 0    tretinoin 0.025 % External Cream Apply a pea-sized amount to the entire face every other night for 2 wks then every night thereafter (Patient not taking: Reported on 4/30/2024) 30 g 4    Multiple Vitamins-Minerals (MULTI VITAMIN/MINERALS) Oral Tab Take by mouth.      ALLEGRA 180 MG OR TABS 1 TABLET DAILY        Past Medical History:    Sleep apnea      History reviewed. No pertinent surgical history.   Family History   Problem Relation Age of Onset    Cancer Father     Hypertension Father       Social History     Socioeconomic History    Marital status:    Tobacco Use     Smoking status: Former     Current packs/day: 0.00     Average packs/day: 1 pack/day for 25.0 years (25.0 ttl pk-yrs)     Types: Cigarettes     Start date: 1980     Quit date: 2005     Years since quittin.0    Smokeless tobacco: Never   Substance and Sexual Activity    Alcohol use: Yes     Alcohol/week: 0.0 standard drinks of alcohol     Comment: Socially    Drug use: No           REVIEW OF SYSTEMS:     Denies nause, fever, chills  No calf pain  Denies chest pain or SOB      EXAM:   There were no vitals taken for this visit.  GENERAL: well developed, well nourished, in no apparent distress  EXTREMITIES:   1. Integument: Normal skin temperature and turgor  2. Vascular: Dorsalis pedis two out of four bilateral and posterior tibial pulses two out of   four bilateral, capillary refill normal.   3. Musculoskeletal: All muscle groups are graded 5 out of 5 in the foot and ankle.   4. Neurological: Normal sharp dull sensation; reflexes normal.             ASSESSMENT AND PLAN:   Diagnoses and all orders for this visit:    Sprain of right foot, subsequent encounter        Plan:     Patient seen and examined findings discussed with patient.  Advised patient to continue walking in supportive shoes at all times.  Avoid walking barefoot.  Return to clinic if symptoms worsen or fail to improve.    The patient indicates understanding of these issues and agrees to the plan.        Yael Yip DPM

## 2024-08-17 ENCOUNTER — HOSPITAL ENCOUNTER (OUTPATIENT)
Age: 64
Discharge: HOME | End: 2024-08-17
Payer: COMMERCIAL

## 2024-08-17 ENCOUNTER — APPOINTMENT (OUTPATIENT)
Dept: GENERAL RADIOLOGY | Age: 64
End: 2024-08-17
Attending: NURSE PRACTITIONER
Payer: COMMERCIAL

## 2024-08-17 VITALS
OXYGEN SATURATION: 99 % | HEART RATE: 68 BPM | BODY MASS INDEX: 27.47 KG/M2 | SYSTOLIC BLOOD PRESSURE: 129 MMHG | DIASTOLIC BLOOD PRESSURE: 72 MMHG | HEIGHT: 66.5 IN | RESPIRATION RATE: 18 BRPM | TEMPERATURE: 98 F | WEIGHT: 173 LBS

## 2024-08-17 DIAGNOSIS — S20.212A CONTUSION OF RIB ON LEFT SIDE, INITIAL ENCOUNTER: Primary | ICD-10-CM

## 2024-08-17 DIAGNOSIS — S80.01XA CONTUSION OF RIGHT KNEE, INITIAL ENCOUNTER: ICD-10-CM
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
## 2024-08-17 PROCEDURE — 99213 OFFICE O/P EST LOW 20 MIN: CPT | Performed by: NURSE PRACTITIONER

## 2024-08-17 PROCEDURE — 73560 X-RAY EXAM OF KNEE 1 OR 2: CPT | Performed by: NURSE PRACTITIONER

## 2024-08-17 PROCEDURE — 71101 X-RAY EXAM UNILAT RIBS/CHEST: CPT | Performed by: NURSE PRACTITIONER

## 2024-08-17 NOTE — ED INITIAL ASSESSMENT (HPI)
Pt presents to the IC with c/o a fall yesterday from a trip, landing on brick pavers. Pt notes left rib pain with right knee pain. Pt has applied ice to the areas. Pt took advil 200mg at 0730 today. No head injury or LOC.

## 2024-08-17 NOTE — ED PROVIDER NOTES
Patient Seen in: [facility]      History     Chief Complaint   Patient presents with    Fall     Stated Complaint: fall; rib pain  Subjective:   HPI    This is a 64-year-old female history of sleep apnea who presents with a chief complaint of left anterior rib pain in addition to knee pain.  Patient states that she tripped yesterday while in her garden and landed on brick flavors.  Since then left anterior rib pain and right knee pain.  No shortness of breath or chest pain.  Pain is worse with movement.  No LOC.  Not on any anticoagulants.  No head neck or back pain.    Objective:   Past Medical History:    Sleep apnea            History reviewed. No pertinent surgical history.           Social History     Socioeconomic History    Marital status:    Tobacco Use    Smoking status: Former     Current packs/day: 0.00     Average packs/day: 1 pack/day for 25.0 years (25.0 ttl pk-yrs)     Types: Cigarettes     Start date: 1980     Quit date: 2005     Years since quittin.3    Smokeless tobacco: Never   Substance and Sexual Activity    Alcohol use: Yes     Alcohol/week: 0.0 standard drinks of alcohol     Comment: Socially    Drug use: No            Review of Systems   All other systems reviewed and are negative.      Positive for stated complaint: Fall    Other systems are as noted in HPI.  Constitutional and vital signs reviewed.      All other systems reviewed and negative except as noted above.    Physical Exam     ED Triage Vitals [24 1002]   /72   Pulse 68   Resp 18   Temp 98.1 °F (36.7 °C)   Temp src Temporal   SpO2 99 %   O2 Device None (Room air)     Current:/72   Pulse 68   Temp 98.1 °F (36.7 °C) (Temporal)   Resp 18   Ht 168.9 cm (5' 6.5\")   Wt 78.5 kg   SpO2 99%   BMI 27.50 kg/m²     Physical Exam  Vitals and nursing note reviewed.   Constitutional:       General: She is awake. She is not in acute distress.     Appearance: Normal appearance. She is not  ill-appearing, toxic-appearing or diaphoretic.   HENT:      Head: Normocephalic and atraumatic.      Right Ear: Tympanic membrane, ear canal and external ear normal.      Left Ear: Tympanic membrane, ear canal and external ear normal.      Nose: Nose normal.      Mouth/Throat:      Mouth: Mucous membranes are moist.      Pharynx: Oropharynx is clear. Uvula midline.   Eyes:      General: Lids are normal.      Extraocular Movements: Extraocular movements intact.      Conjunctiva/sclera: Conjunctivae normal.      Pupils: Pupils are equal, round, and reactive to light.   Cardiovascular:      Rate and Rhythm: Normal rate and regular rhythm.      Pulses: Normal pulses.      Heart sounds: Normal heart sounds.   Pulmonary:      Effort: Pulmonary effort is normal.      Breath sounds: Normal breath sounds and air entry. No stridor, decreased air movement or transmitted upper airway sounds.   Chest:      Chest wall: Tenderness present.          Comments: No ecchymosis, no swelling.  Musculoskeletal:      Right knee: Normal.   Skin:     General: Skin is warm and dry.      Capillary Refill: Capillary refill takes less than 2 seconds.   Neurological:      General: No focal deficit present.      Mental Status: She is alert and oriented to person, place, and time.   Psychiatric:         Mood and Affect: Mood normal.         Behavior: Behavior normal. Behavior is cooperative.         Thought Content: Thought content normal.         Judgment: Judgment normal.       ED Course   X-ray and reevaluate.  X-ray knee reviewed, no acute fracture.  X-ray ribs reviewed, unremarkable left rib series.  XR KNEE (1 OR 2 VIEWS), RIGHT (CPT=73560)    Result Date: 8/17/2024  CONCLUSION:   Tiny tricompartmental osteophytes with preserved joint spaces.      Dictated by (CST): Guillermo Marks MD on 8/17/2024 at 10:40 AM     Finalized by (CST): Guillermo Marks MD on 8/17/2024 at 10:41 AM          XR RIBS WITH CHEST (3 VIEWS), LEFT  (CPT=71101)    Result Date:  8/17/2024  CONCLUSION:   Unremarkable left rib series    Dictated by (CST): Guillermo Marks MD on 8/17/2024 at 10:36 AM     Finalized by (CST): Guillermo Marks MD on 8/17/2024 at 10:37 AM         Labs Reviewed - No data to display    MDM     Medical Decision Making  Differential diagnoses reflecting the complexity of care include but are not limited to rib contusion, knee contusion, rib fracture, knee fracture.    Comorbidities that add complexity to management include: N/A  History obtained by an independent source was from: N/A  Discussions of management was done with: N/A  My independent interpretations of studies include: X-ray ribs and knee were unremarkable.  Personally reviewed the images.  Shared decision making was done by: Patient and myself  Patient is well appearing, non-toxic and in no acute distress.  Vital signs are stable.  Discussed x-ray findings with the patient.  Discussed she should take Tylenol or ibuprofen as needed for pain.  X-ray here were unremarkable.  Close follow-up with the primary care provider as recommended.    Problems Addressed:  Fall: acute illness or injury    Amount and/or Complexity of Data Reviewed  Radiology: ordered and independent interpretation performed. Decision-making details documented in ED Course.    Risk  OTC drugs.        Disposition and Plan     Clinical Impression:  1. Contusion of rib on left side, initial encounter    2. Contusion of right knee, initial encounter    3. Fall, initial encounter         Disposition:  Discharge  8/17/2024 10:47 am    Follow-up:  Jose Bell MD  [address] [facility] [address]  [facility] [address]  [phone]                Medications Prescribed:  Discharge Medication List as of 8/17/2024 10:48 AM             Note to patient: The 21st Century cares act makes medical notes like these available to patients in the interest of transparency.  However, be advised this medical document and is intended as peer to peer communication.  It is read the  medical language and may contain abbreviations or verbiage that are unfamiliar.  It may appear blunt or direct.  Medical documents are intended to carry relevant information, fax is evident and the clinical opinion of the practitioner.    This note was prepared using Dragon Medical voice recognition dictation software.  As a result, errors may occur.  When identified, these errors have been corrected.  While every attempt is made to correct errors during dictation, discrepancies may still exist.    Rita Elizabeth, DYLAN  8/17/2024  10:47 AM

## 2024-08-17 NOTE — DISCHARGE INSTRUCTIONS
Please ice the knee.  A splint of the left ribs when coughing or moving in bed.  Continue to take Advil as needed for pain.  Please follow-up with your primary care provider.  Any worsening symptoms please go to the ER.

## 2024-10-21 ENCOUNTER — TELEPHONE (OUTPATIENT)
Dept: PULMONOLOGY | Facility: CLINIC | Age: 64
End: 2024-10-21

## 2024-10-21 NOTE — TELEPHONE ENCOUNTER
Received order for PAP supplies from [employer] via [place]. Placed in Dr Hyatt's folder for signature

## 2024-10-28 ENCOUNTER — HOSPITAL ENCOUNTER (OUTPATIENT)
Age: 64
Discharge: HOME | End: 2024-10-28
Payer: COMMERCIAL

## 2024-10-28 VITALS
HEART RATE: 63 BPM | TEMPERATURE: 98 F | RESPIRATION RATE: 16 BRPM | DIASTOLIC BLOOD PRESSURE: 85 MMHG | OXYGEN SATURATION: 99 % | SYSTOLIC BLOOD PRESSURE: 140 MMHG

## 2024-10-28 DIAGNOSIS — L03.011 CELLULITIS OF FINGER OF RIGHT HAND: Primary | ICD-10-CM

## 2024-10-28 PROCEDURE — 99213 OFFICE O/P EST LOW 20 MIN: CPT | Performed by: NURSE PRACTITIONER

## 2024-10-28 RX ORDER — CEPHALEXIN 500 MG/1
500 CAPSULE ORAL 2 TIMES DAILY
Qty: 14 CAPSULE | Refills: 0 | Status: SHIPPED | OUTPATIENT
Start: 2024-10-28 | End: 2024-11-04

## 2024-10-28 RX ORDER — SULFAMETHOXAZOLE AND TRIMETHOPRIM 800; 160 MG/1; MG/1
1 TABLET ORAL 2 TIMES DAILY
Qty: 14 TABLET | Refills: 0 | Status: SHIPPED | OUTPATIENT
Start: 2024-10-28 | End: 2024-11-04

## 2024-10-28 NOTE — ED PROVIDER NOTES
Patient Seen in: [facility]      History     Chief Complaint   Patient presents with    Arm or Hand Injury     Stated Complaint: rt index finger swelling    Subjective: This is a 64-year-old female, past medical history of sleep apnea, presents to immediate care for redness, swelling, tenderness of second digit of right hand.  Patient states she was gardening yesterday, she was wearing protective gloves, she was trimming rosebushes when she believes she was \"poked by a thorn\".  Patient denies retained thorn or foreign body.  However, reports redness, swelling, pain of second digit.  No fever, chills, fatigue.  Well-appearing.  AOx4.  The history is provided by the patient.             Objective:     No pertinent past medical history.            No pertinent past surgical history.              No pertinent social history.            Review of Systems   Constitutional: Negative.    Musculoskeletal:  Positive for joint swelling. Negative for arthralgias, back pain, myalgias, neck pain and neck stiffness.   Skin:  Positive for color change and wound.       Positive for stated complaint: rt index finger swelling  Other systems are as noted in HPI.  Constitutional and vital signs reviewed.      All other systems reviewed and negative except as noted above.    Physical Exam     ED Triage Vitals [10/28/24 0838]   /85   Pulse 63   Resp 16   Temp 97.7 °F (36.5 °C)   Temp src Temporal   SpO2 99 %   O2 Device None (Room air)       Current Vitals:   Vital Signs  BP: 140/85  Pulse: 63  Resp: 16  Temp: 97.7 °F (36.5 °C)  Temp src: Temporal    Oxygen Therapy  SpO2: 99 %  O2 Device: None (Room air)        Physical Exam  Constitutional:       General: She is not in acute distress.     Appearance: Normal appearance. She is not ill-appearing or toxic-appearing.   HENT:      Head: Normocephalic.   Cardiovascular:      Rate and Rhythm: Normal rate.      Pulses: Normal pulses.   Pulmonary:      Effort: Pulmonary effort  is normal.   Musculoskeletal:         General: Swelling and tenderness present.   Skin:     General: Skin is warm.      Capillary Refill: Capillary refill takes less than 2 seconds.      Findings: Erythema present.          Neurological:      General: No focal deficit present.      Mental Status: She is alert and oriented to person, place, and time.             ED Course   Labs Reviewed - No data to display                MDM      Differentials considered include: Cellulitis, paronychia, flexor tenosynovitis, felon.    Erythema and swelling is not just localized to lateral or medial nailbed/nail fold.  It is to distal aspect of finger and circumferential.  Unlikely to be paronychia.    Patient has no tenderness to percussion over palmar surface of digit.  Tendon intact.  She does have range of motion.  No evidence to suggest flexor tenosynovitis.     Unlikely to be felon.  There is no red, tense, markedly painful distal pulp space.    Not appear to be herpetic he.  There is no vesicular bullae.  Does not describe burning sensation.    Likely cellulitis of digit.  Keflex and Bactrim prescribed.  Patient is aware to avoid secondary trauma.  She is aware to perform warm soaks daily.  Tylenol every 4 hours Motrin for 6 hours.  She can alternate with ice for alleviation.    Patient is aware of signs symptoms that warrant reevaluation.  She verbalized understand agrees with plan of care.        Medical Decision Making      Disposition and Plan     Clinical Impression:  1. Cellulitis of finger of right hand         Disposition:  Discharge  10/28/2024  8:49 am    Follow-up:  Jose Bell MD  [address] [facility] [address]  [facility] [id number] [phone]    In 3 days  If symptoms worsen          Medications Prescribed:  Discharge Medication List as of 10/28/2024  8:49 AM        START taking these medications    Details   cephALEXin 500 MG Oral Cap Take 1 capsule (500 mg total) by mouth 2 (two) times daily for 7 days.,  Normal, Disp-14 capsule, R-0      sulfamethoxazole-trimethoprim -160 MG Oral Tab per tablet Take 1 tablet by mouth 2 (two) times daily for 7 days., Normal, Disp-14 tablet, R-0                 Supplementary Documentation:

## 2024-10-28 NOTE — DISCHARGE INSTRUCTIONS
As discussed, does appear that you have cellulitis of your finger.  2 separate antibiotics prescribed.  1 is broad-spectrum and then 1 is to cover for MRSA.  Take both antibiotics twice a day for 7 days.  Take with food and water.  You may take a probiotic while taking antibiotics.    Continue with Motrin for pain, redness, swelling, inflammation.  You may also supplement Tylenol every 4 hours if desired.  Warm soaks daily for 10 to 15 minutes and just warm water.  You may alternate with applying ice pack as well.    If area becomes more red, swollen, tender, drainage, discharge, inability to bend finger, fevers above 100.4, please go to ER.

## 2024-11-12 NOTE — ED INITIAL ASSESSMENT (HPI)
Patient presents a&o 4/4 with c/o cough, congestion x 2 day. + yellow sputum. Denies fevers    Patient also concerned for red bump with \"something inside of it\" Denies drainage.  Concerned for tick bite Patient

## 2024-12-02 ENCOUNTER — HOSPITAL ENCOUNTER (OUTPATIENT)
Age: 64
Discharge: HOME | End: 2024-12-02
Payer: COMMERCIAL

## 2024-12-02 VITALS
OXYGEN SATURATION: 99 % | SYSTOLIC BLOOD PRESSURE: 134 MMHG | DIASTOLIC BLOOD PRESSURE: 77 MMHG | HEART RATE: 57 BPM | TEMPERATURE: 98 F | RESPIRATION RATE: 19 BRPM

## 2024-12-02 DIAGNOSIS — R05.9 COUGH: ICD-10-CM

## 2024-12-02 DIAGNOSIS — J01.90 ACUTE NON-RECURRENT SINUSITIS, UNSPECIFIED LOCATION: ICD-10-CM

## 2024-12-02 DIAGNOSIS — J02.9 ACUTE PHARYNGITIS, UNSPECIFIED ETIOLOGY: ICD-10-CM

## 2024-12-02 DIAGNOSIS — H66.91 RIGHT ACUTE OTITIS MEDIA: Primary | ICD-10-CM

## 2024-12-02 LAB
S PYO AG THROAT QL: NEGATIVE
SARS-COV-2 RNA RESP QL NAA+PROBE: NOT DETECTED

## 2024-12-02 PROCEDURE — U0002 COVID-19 LAB TEST NON-CDC: HCPCS | Performed by: PHYSICIAN ASSISTANT

## 2024-12-02 PROCEDURE — 87880 STREP A ASSAY W/OPTIC: CPT | Performed by: PHYSICIAN ASSISTANT

## 2024-12-02 PROCEDURE — 99213 OFFICE O/P EST LOW 20 MIN: CPT | Performed by: PHYSICIAN ASSISTANT

## 2024-12-02 RX ORDER — BENZONATATE 100 MG/1
100 CAPSULE ORAL 3 TIMES DAILY PRN
Qty: 30 CAPSULE | Refills: 0 | Status: SHIPPED | OUTPATIENT
Start: 2024-12-02 | End: 2025-01-01

## 2024-12-02 RX ORDER — ALBUTEROL SULFATE 90 UG/1
2 INHALANT RESPIRATORY (INHALATION) EVERY 4 HOURS PRN
Qty: 1 EACH | Refills: 0 | Status: SHIPPED | OUTPATIENT
Start: 2024-12-02 | End: 2025-01-01

## 2024-12-02 RX ORDER — ESTRADIOL 0.03 MG/D
FILM, EXTENDED RELEASE TRANSDERMAL
COMMUNITY
Start: 2024-11-20

## 2024-12-02 RX ORDER — IBUPROFEN 600 MG/1
TABLET, FILM COATED ORAL
Qty: 20 TABLET | Refills: 0 | Status: SHIPPED | OUTPATIENT
Start: 2024-12-02

## 2024-12-02 RX ORDER — PROGESTERONE 100 MG/1
CAPSULE ORAL
COMMUNITY
Start: 2024-11-25

## 2024-12-02 NOTE — ED PROVIDER NOTES
Patient Seen in: [facility]    History     Chief Complaint   Patient presents with    Sinus Problem     Stated Complaint: sinus pain    HPI    Cate Clayton is a 64 year old female presents with chief complaint of sore throat.  Onset 3 days ago.  Patient reports associated cough, nasal congestion, sinus pain, right earache and chills.  Patient states they are tolerating solid food and oral liquids.  Patient denies fever, otorrhea, trismus, drooling, neck pain, restricted neck movement, neck swelling, rash, dyspnea, wheeze, abdominal pain, nausea, vomiting, diarrhea, constipation.      Past Medical History:    Sleep apnea       History reviewed. No pertinent surgical history.         Family History   Problem Relation Age of Onset    Cancer Father     Hypertension Father        Social History     Socioeconomic History    Marital status:    Tobacco Use    Smoking status: Former     Current packs/day: 0.00     Average packs/day: 1 pack/day for 25.0 years (25.0 ttl pk-yrs)     Types: Cigarettes     Start date: 1980     Quit date: 2005     Years since quittin.6    Smokeless tobacco: Never   Substance and Sexual Activity    Alcohol use: Yes     Alcohol/week: 0.0 standard drinks of alcohol     Comment: Socially    Drug use: No       Review of Systems    Positive for stated complaint: sinus pain  Other systems are as noted in HPI.  Constitutional and vital signs reviewed.      All other systems reviewed and negative except as noted above.    PSFH elements reviewed from today and agreed except as otherwise stated in HPI.    Physical Exam     ED Triage Vitals [24 0855]   /77   Pulse 57   Resp 19   Temp 98.4 °F (36.9 °C)   Temp src Oral   SpO2 99 %   O2 Device None (Room air)       Current:/77   Pulse 57   Temp 98.4 °F (36.9 °C) (Oral)   Resp 19   SpO2 99%     PULSE OX within normal limits on room air as interpreted by this provider.     Constitutional: The  patient is cooperative. Appears well-developed and well-nourished.  Mild discomfort.  Psychological: Alert, No abnormalities of mood, affect.  Head: Normocephalic/atraumatic.    Eyes: Pupils are equal round reactive to light.  Conjunctiva are within normal limits.  ENT: Pharynx injected.  Tonsils within normal size limits bilaterally.  No tonsillar exudates.  Uvula midline.  No trismus.  No drooling.  Right TM injected.  Purulent fluid present proximally to intact right TM.  Left TM within normal limits.  Auditory canals within normal limits bilaterally.  No post auricular tenderness, adenopathy or erythema.  No otorrhea.  Mucous membranes moist.  Neck: The neck is supple.  Nontender.  No meningeal signs.  Chest: There is no tenderness to the chest wall.  No CVA tenderness bilaterally.  Respiratory: Respiratory effort was normal.  There is no rales, wheezes, or rhonchi.  There is no stridor.  Air entry is equal.  Cardiovascular: Regular rate and rhythm.  Capillary refill is brisk.    Genitourinary: Not examined.  Lymphatic: No gross lymphadenopathy noted.  Musculoskeletal: Musculoskeletal system is grossly intact.  There is no obvious deformity.  Neurological: No facial asymmetry.  Normal gait.  Normal sensory exam.  Patient exhibits normal speech.  Strength and range of motion symmetrical of all extremities x4.  Skin: Skin is normal to inspection.  Warm and dry.  No obvious bruising.  No obvious rash.        ED Course     Labs Reviewed   POCT RAPID STREP - Normal   RAPID SARS-COV-2 BY PCR - Normal   GRP A STREP CULT, THROAT       MDM     Differential diagnosis prior to work-up including but not limited to strep pharyngitis, viral pharyngitis, URI, bronchitis, pneumonia, sinusitis    Rapid strep negative.  COVID-19 negative.    Physical exam remained stable over serial reexaminations as previously documented.  Results reviewed with patient.    I have given the patient instructions regarding their diagnoses,  expectations, follow up, and ER precautions. I explained to the patient that emergent conditions may arise and to go to the ER for new, worsening or any persistent conditions. I've explained the importance of following up with their doctor as instructed. The patient verbalized understanding of the discharge instructions and plan.        Disposition and Plan     Clinical Impression:  1. Right acute otitis media    2. Cough    3. Acute non-recurrent sinusitis, unspecified location    4. Acute pharyngitis, unspecified etiology        Disposition:  Discharge    Follow-up:  Jose Bell MD  [address] [facility] [address]  [facility] [address]  [phone]    Call in 1 day  For follow-up      Medications Prescribed:  Discharge Medication List as of 12/2/2024  9:26 AM        START taking these medications    Details   amoxicillin clavulanate 875-125 MG Oral Tab Take 1 tablet by mouth 2 (two) times daily for 7 days., Normal, Disp-14 tablet, R-0      ibuprofen 600 MG Oral Tab Take 1 tablet (600 mg total) by mouth every 6 hours with food, Normal, Disp-20 tablet, R-0      albuterol 108 (90 Base) MCG/ACT Inhalation Aero Soln Inhale 2 puffs into the lungs every 4 (four) hours as needed for Wheezing., Normal, Disp-1 each, R-0      !! Spacer/Aero-Holding Chambers Does not apply Device Use with albuterol inhaler, Normal, Disp-1 each, R-0      benzonatate 100 MG Oral Cap Take 1 capsule (100 mg total) by mouth 3 (three) times daily as needed for cough., Normal, Disp-30 capsule, R-0       !! - Potential duplicate medications found. Please discuss with provider.

## 2024-12-16 ENCOUNTER — OFFICE VISIT (OUTPATIENT)
Dept: PULMONOLOGY | Facility: CLINIC | Age: 64
End: 2024-12-16

## 2024-12-16 VITALS
OXYGEN SATURATION: 97 % | BODY MASS INDEX: 27.47 KG/M2 | WEIGHT: 173 LBS | HEIGHT: 66.5 IN | SYSTOLIC BLOOD PRESSURE: 138 MMHG | DIASTOLIC BLOOD PRESSURE: 74 MMHG | HEART RATE: 60 BPM

## 2024-12-16 DIAGNOSIS — G47.33 OSA (OBSTRUCTIVE SLEEP APNEA): Primary | ICD-10-CM

## 2024-12-16 PROCEDURE — 3075F SYST BP GE 130 - 139MM HG: CPT | Performed by: INTERNAL MEDICINE

## 2024-12-16 PROCEDURE — 3008F BODY MASS INDEX DOCD: CPT | Performed by: INTERNAL MEDICINE

## 2024-12-16 PROCEDURE — 99214 OFFICE O/P EST MOD 30 MIN: CPT | Performed by: INTERNAL MEDICINE

## 2024-12-16 PROCEDURE — 3078F DIAST BP <80 MM HG: CPT | Performed by: INTERNAL MEDICINE

## 2024-12-16 NOTE — PROGRESS NOTES
Subjective:   Patient ID: Cate Clayton is a 64 year old female.    HPI  Doing very well with CPAP and very compliant and vigilant about using it every night and all night with no technical issue usually refreshed in the morning with no significant residual daytime sleepiness or fatigue  Regular time of sleep at night  Had mild upper respiratory tract infection few weeks ago and now recovering well with no active cough or sputum or dyspnea or fever  History/Other:   Review of Systems   Constitutional: Negative.    HENT:  Positive for congestion.    Respiratory: Negative.     Cardiovascular: Negative.    Gastrointestinal: Negative.    Musculoskeletal: Negative.    Skin: Negative.    Neurological: Negative.    Hematological: Negative.    Psychiatric/Behavioral: Negative.       Current Outpatient Medications   Medication Sig Dispense Refill    progesterone 100 MG Oral Cap Take 1 capsule at bedtime 6 nights each week.      estradiol 0.025 MG/24HR Transdermal Patch Biweekly Apply to skin. Remove and replace every 3.5 days.      ibuprofen 600 MG Oral Tab Take 1 tablet (600 mg total) by mouth every 6 hours with food 20 tablet 0    albuterol 108 (90 Base) MCG/ACT Inhalation Aero Soln Inhale 2 puffs into the lungs every 4 (four) hours as needed for Wheezing. 1 each 0    Spacer/Aero-Holding Chambers Does not apply Device Use with albuterol inhaler 1 each 0    benzonatate 100 MG Oral Cap Take 1 capsule (100 mg total) by mouth 3 (three) times daily as needed for cough. 30 capsule 0    methylPREDNISolone 4 MG Oral Tablet Therapy Pack Take per package insert (instructions). Take as directed on the box 21 tablet 0    Spacer/Aero-Holding Chambers Does not apply Device Use with albuterol inhaler 1 each 0    Rimegepant Sulfate (NURTEC) 75 MG Oral Tablet Dispersible Take 75 mg by mouth as needed. Take one tablet at onset of migraine.  Maximum dose in 24 hours is 1 tablet (75mg). 8 tablet 11    estradiol 0.1 MG/GM Vaginal  Cream APPLY 1-2 GRAM TO AFFECTED AREA AS NEEDED      fluticasone propionate 50 MCG/ACT Nasal Suspension       carbamide peroxide 6.5 % Otic Solution Place 5 drops into the right ear as needed. 15 mL 0    Vitamin B-12 500 MCG Oral Tab Take 1 tablet (500 mcg total) by mouth daily.      Saline (AYR NASAL MIST ALLERGY/SINUS) 2.65 % Nasal Solution 1 spray by Nasal route 3 (three) times daily. 50 mL 0    Multiple Vitamins-Minerals (MULTI VITAMIN/MINERALS) Oral Tab Take by mouth.      ALLEGRA 180 MG OR TABS 1 TABLET DAILY      tretinoin 0.025 % External Cream Apply a pea-sized amount to the entire face every other night for 2 wks then every night thereafter (Patient not taking: Reported on 12/16/2024) 30 g 4     Allergies:Allergies[1]    Objective:   Physical Exam  Constitutional:       General: She is not in acute distress.     Appearance: Normal appearance.   HENT:      Head: Normocephalic and atraumatic.      Nose: Nose normal.      Mouth/Throat:      Mouth: Mucous membranes are moist.   Eyes:      General: No scleral icterus.     Pupils: Pupils are equal, round, and reactive to light.   Cardiovascular:      Rate and Rhythm: Normal rate.      Heart sounds: No murmur heard.     No gallop.   Pulmonary:      Effort: No respiratory distress.      Breath sounds: No stridor. No wheezing, rhonchi or rales.   Chest:      Chest wall: No tenderness.   Abdominal:      General: Abdomen is flat. Bowel sounds are normal.      Palpations: Abdomen is soft.   Musculoskeletal:      Cervical back: Normal range of motion.      Right lower leg: No edema.      Left lower leg: No edema.   Skin:     General: Skin is dry.   Neurological:      Mental Status: She is oriented to person, place, and time.         Assessment & Plan:   1. CORDELIA (obstructive sleep apnea)        1- severe CORDELIA   Doing well clinically  on CPAP therapy  Excellent subjective and objective compliance  I reviewed her download data with 100% adherence  Effective therapy with  normal AHI     Avoid sedative / narcotics / alcohol   Avoid driving if sleepy   Diet / exercise   Maintain regular sleep-awake cycles      F/u in one year     Meds This Visit:  Requested Prescriptions      No prescriptions requested or ordered in this encounter       Imaging & Referrals:  None         [1]   Allergies  Allergen Reactions    Mupirocin ITCHING and RASH    Bacitracin RASH    Latex RASH

## 2025-02-06 ENCOUNTER — APPOINTMENT (OUTPATIENT)
Dept: CT IMAGING | Facility: HOSPITAL | Age: 65
End: 2025-02-06
Attending: EMERGENCY MEDICINE
Payer: COMMERCIAL

## 2025-02-06 ENCOUNTER — HOSPITAL ENCOUNTER (EMERGENCY)
Facility: HOSPITAL | Age: 65
Discharge: HOME | End: 2025-02-06
Attending: EMERGENCY MEDICINE
Payer: COMMERCIAL

## 2025-02-06 VITALS
TEMPERATURE: 99 F | SYSTOLIC BLOOD PRESSURE: 130 MMHG | BODY MASS INDEX: 26.68 KG/M2 | RESPIRATION RATE: 9 BRPM | OXYGEN SATURATION: 94 % | DIASTOLIC BLOOD PRESSURE: 74 MMHG | WEIGHT: 170 LBS | HEIGHT: 67 IN | HEART RATE: 50 BPM
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
## 2025-02-06 DIAGNOSIS — S09.90XA INJURY OF HEAD, INITIAL ENCOUNTER: ICD-10-CM

## 2025-02-06 PROCEDURE — 70450 CT HEAD/BRAIN W/O DYE: CPT | Performed by: EMERGENCY MEDICINE

## 2025-02-06 PROCEDURE — 99284 EMERGENCY DEPT VISIT MOD MDM: CPT

## 2025-02-06 RX ORDER — METOCLOPRAMIDE 10 MG/1
10 TABLET ORAL 3 TIMES DAILY PRN
Qty: 10 TABLET | Refills: 0 | Status: SHIPPED | OUTPATIENT
Start: 2025-02-06 | End: 2025-03-08

## 2025-02-06 RX ORDER — ACETAMINOPHEN 500 MG
1000 TABLET ORAL ONCE
Status: COMPLETED | OUTPATIENT
Start: 2025-02-06 | End: 2025-02-06

## 2025-02-06 RX ORDER — METOCLOPRAMIDE 10 MG/1
10 TABLET ORAL ONCE
Status: COMPLETED | OUTPATIENT
Start: 2025-02-06 | End: 2025-02-06

## 2025-02-06 NOTE — ED PROVIDER NOTES
Patient Seen in: [facility] Emergency Department      History     Chief Complaint   Patient presents with    Fall    Head Neck Injury     [facility]    Subjective:   HPI          Objective:     Past Medical History:    Sleep apnea              History reviewed. No pertinent surgical history.             Social History     Socioeconomic History    Marital status:    Tobacco Use    Smoking status: Former     Current packs/day: 0.00     Average packs/day: 1 pack/day for 25.0 years (25.0 ttl pk-yrs)     Types: Cigarettes     Start date: 1980     Quit date: 2005     Years since quittin.7    Smokeless tobacco: Never   Substance and Sexual Activity    Alcohol use: Yes     Alcohol/week: 0.0 standard drinks of alcohol     Comment: Socially    Drug use: No                  Physical Exam     ED Triage Vitals   BP 25 0841 151/86   Pulse 25 0841 71   Resp 25 0841 20   Temp 25 0841 98.8 °F (37.1 °C)   Temp src --    SpO2 25 0841 97 %   O2 Device 25 1030 None (Room air)       Current Vitals:   Vital Signs  BP: 130/74  Pulse: 50  Resp: (!) 9  Temp: 98.8 °F (37.1 °C)  MAP (mmHg): 91    Oxygen Therapy  SpO2: 94 %  O2 Device: None (Room air)        Physical Exam        ED Course   Labs Reviewed - No data to display                MDM      64-year-old female without significant past medical history presents today after a fall.  Patient states that she slipped on the ice this morning falling backwards and striking the back of her head.  She denies loss of consciousness.  She also struck her right elbow but denies any significant injury there.  She took 2 Advil prior to arrival.  Denies numbness/weakness, nausea/vomiting, difficulty ambulating, or any other new symptoms.  She is currently having a moderate headache.    On exam, vitals normal, well-appearing, GCS 15, PERRLA, EOMI, strength and sensation in the upper and lower  extremities intact, mild contusion on the occiput    Differential: Slip and fall, mild traumatic brain injury, ICH    CT head was performed and interpreted by myself.  No evidence of acute injury.    Patient was given Tylenol and Reglan and on reexamination in the ED had improved symptoms.    Patient discharged with care instructions, follow-up instructions, and return precautions.    MDM    Disposition and Plan     Clinical Impression:  1. Fall, initial encounter    2. Injury of head, initial encounter         Disposition:  Discharge  2/6/2025 11:30 am    Follow-up:  Jose Bell MD  [address] [facility] [address]  [facility] [address]  [phone]    Schedule an appointment as soon as possible for a visit  As needed          Medications Prescribed:  Discharge Medication List as of 2/6/2025 11:35 AM        START taking these medications    Details   metoclopramide 10 MG Oral Tab Take 1 tablet (10 mg total) by mouth 3 (three) times daily as needed., Normal, Disp-10 tablet, R-0                 Supplementary Documentation:

## 2025-02-06 NOTE — ED INITIAL ASSESSMENT (HPI)
Cate is here for evaluation of slip on ice, which caused to fall and hit the back of her head.  C/o right elbow pain, no deformity.

## (undated) DIAGNOSIS — E78.00 PURE HYPERCHOLESTEROLEMIA: Primary | ICD-10-CM

## (undated) NOTE — LETTER
Date & Time: 10/9/2020, 9:31 AM  Patient: John Lieberman  Encounter Provider(s):    Justin Back MD       To Whom It May Concern:    Snow Gonzalez was seen and treated in our department on 10/9/2020.  She should not return to work until [employer]